# Patient Record
Sex: FEMALE | Race: WHITE | NOT HISPANIC OR LATINO | Employment: FULL TIME | URBAN - NONMETROPOLITAN AREA
[De-identification: names, ages, dates, MRNs, and addresses within clinical notes are randomized per-mention and may not be internally consistent; named-entity substitution may affect disease eponyms.]

---

## 2018-03-07 ENCOUNTER — HOSPITAL ENCOUNTER (EMERGENCY)
Facility: HOSPITAL | Age: 23
Discharge: HOME | End: 2018-03-07
Admitting: EMERGENCY MEDICINE

## 2018-03-07 VITALS
WEIGHT: 84 LBS | TEMPERATURE: 99.5 F | DIASTOLIC BLOOD PRESSURE: 71 MMHG | SYSTOLIC BLOOD PRESSURE: 122 MMHG | HEART RATE: 94 BPM | RESPIRATION RATE: 16 BRPM | HEIGHT: 60 IN | OXYGEN SATURATION: 100 % | BODY MASS INDEX: 16.49 KG/M2

## 2018-03-07 DIAGNOSIS — M62.838 MUSCLE SPASM OF LEFT SHOULDER AREA: Primary | ICD-10-CM

## 2018-03-07 PROCEDURE — 96372 THER/PROPH/DIAG INJ SC/IM: CPT

## 2018-03-07 PROCEDURE — 99283 EMERGENCY DEPT VISIT LOW MDM: CPT

## 2018-03-07 PROCEDURE — 25010000002 METHYLPREDNISOLONE PER 125 MG: Performed by: PHYSICIAN ASSISTANT

## 2018-03-07 RX ORDER — METHYLPREDNISOLONE SODIUM SUCCINATE 125 MG/2ML
125 INJECTION, POWDER, LYOPHILIZED, FOR SOLUTION INTRAMUSCULAR; INTRAVENOUS ONCE
Status: COMPLETED | OUTPATIENT
Start: 2018-03-07 | End: 2018-03-07

## 2018-03-07 RX ORDER — METHYLPREDNISOLONE 4 MG/1
TABLET ORAL
Qty: 21 EACH | Refills: 0 | Status: SHIPPED | OUTPATIENT
Start: 2018-03-07 | End: 2022-06-28

## 2018-03-07 RX ADMIN — METHYLPREDNISOLONE SODIUM SUCCINATE 125 MG: 125 INJECTION, POWDER, FOR SOLUTION INTRAMUSCULAR; INTRAVENOUS at 17:45

## 2018-03-07 NOTE — ED PROVIDER NOTES
Subjective   Patient is a 22 y.o. female presenting with upper extremity pain.   Upper Extremity Issue     Patient is a 21 yo female presenting with pain that she reports originates in her left periscapular region. The pain radiates down her left arm causing numbness in her hand. She describes the pain around her shoulder as sharp and stabbing.The pain also radiates across her chest which is exacerbated with deep breathes. She reports the pain began last night while she was driving home from work. She admits to throwing out the trash just prior to leaving which involved her lifting a heavy bag over her head to put it in the dumpster. She denies immediate pain after throwing out the trash. The pain is exacerbated when she moves her left arm and is relieved with rest. She denies prior history of back injuries. Patient denies nausea, shortness of breath, or diaphoresis. She has not taken any medications for pain.     Review of Systems   Constitutional: Positive for activity change. Negative for appetite change.   HENT: Negative.    Eyes: Negative.    Respiratory: Negative.  Negative for shortness of breath.    Cardiovascular: Positive for chest pain. Negative for palpitations and leg swelling.   Gastrointestinal: Negative.    Endocrine: Negative.    Genitourinary: Negative.    Musculoskeletal: Positive for arthralgias and myalgias.   Skin: Negative.    Allergic/Immunologic: Negative.    Neurological: Negative.  Negative for weakness and numbness.   Hematological: Negative.    Psychiatric/Behavioral: Negative.    All other systems reviewed and are negative.      No past medical history on file.    No Known Allergies    No past surgical history on file.    No family history on file.    Social History     Social History   • Marital status: Single       Prior to Admission medications    Medication Sig Start Date End Date Taking? Authorizing Provider   MethylPREDNISolone (MEDROL, LINH,) 4 MG tablet Take as directed on  "package instructions. 3/7/18   GARFIELD Arita       Medications   methylPREDNISolone sodium succinate (SOLU-Medrol) injection 125 mg (125 mg Intramuscular Given 3/7/18 1745)       /71 (BP Location: Right arm, Patient Position: Sitting)  Pulse 94  Temp 99.5 °F (37.5 °C) (Temporal Artery )   Resp 14  Ht 152.4 cm (60\")  Wt 38.1 kg (84 lb)  SpO2 100%  BMI 16.41 kg/m2      Objective   Physical Exam   Constitutional: She is oriented to person, place, and time. She appears well-developed and well-nourished. No distress.   HENT:   Head: Normocephalic and atraumatic.   Eyes: EOM are normal. Pupils are equal, round, and reactive to light.   Neck: Normal range of motion. Neck supple. No JVD present.   Cardiovascular: Normal rate, regular rhythm, normal heart sounds and intact distal pulses.  Exam reveals no gallop and no friction rub.    No murmur heard.  Pulmonary/Chest: Effort normal and breath sounds normal. No respiratory distress. She has no wheezes. She has no rales.   Abdominal: Soft. Bowel sounds are normal.   Musculoskeletal:        Arms:  Patients pain is reproduced with horizontal abduction of left arm and palpation of superomedial border of left scapula. Patient has full ROM and strength in bilateral upper extremities.    Neurological: She is alert and oriented to person, place, and time.   Patient has normal sensation and  strength of bilateral upper extremities.    Skin: Skin is warm and dry. No rash noted. She is not diaphoretic. No erythema.   Psychiatric: She has a normal mood and affect. Her behavior is normal. Thought content normal.   Nursing note and vitals reviewed.      Procedures         Lab Results (last 24 hours)     ** No results found for the last 24 hours. **          No results found.    ED Course  ED Course          MDM    Final diagnoses:   Muscle spasm of left shoulder area          GARFIELD Arita  03/07/18 1805    "

## 2018-03-07 NOTE — ED NOTES
Patient reports that around midnight last night she started experiencing pain to her left shoulder blade that radiated thorough to her left chest. The pain is described as a constant pain with intermittent episodes of sharp shooting pain. Patient also reports numbness extending down her left arm.       Moisés Scales RN  03/07/18 1977

## 2018-03-08 NOTE — ED NOTES
Discharge instructions reviewed with patient and family. Patient advised that she may leave 20 minutes after injection to make sure that there is reactions to the medication. Patient repots that she has had this medication before and would like to leave.  Family at bedside report that patient has had this medication and never had a reaction. Discharge vitals not obtained. Patient was ready for discharge.      Moisés Scales RN  03/07/18 1943

## 2018-03-12 NOTE — ED NOTES
"ED Call Back Questions    1. How are you doing since leaving the Emergency Department?    Doing better.  No ER issues.  2. Do you have any questions about your discharge instructions? No     3. Have you filled your new prescriptions yet? N/A  a. Do you have any questions about those medications? N/A    4. Were you able to make a follow-up appointment with the physician? No     5. Do you have a primary care physician? Yes   a. If No, would you like for me to set you up with one? N/A  i. If Yes, “I will have our ED  give you a call right back at this number to work with you on the best time for an appointment.”    6. We are always looking to get better at what we do. Do you have any suggestions for what we can do to be even better? No   a. If Yes, \"Thank you for sharing your concerns. I apologize. I will follow up with our manager and patient . Would you like someone to call you back?\" N/A    7. Is there anything else I can do for you? No     "

## 2018-03-22 ENCOUNTER — HOSPITAL ENCOUNTER (OUTPATIENT)
Dept: GENERAL RADIOLOGY | Age: 23
Discharge: HOME | End: 2018-03-22
Payer: COMMERCIAL

## 2018-03-22 DIAGNOSIS — R52 PAIN: ICD-10-CM

## 2018-03-22 PROCEDURE — 73130 X-RAY EXAM OF HAND: CPT

## 2020-11-02 ENCOUNTER — TRANSCRIBE ORDERS (OUTPATIENT)
Dept: OTHER | Facility: OTHER | Age: 25
End: 2020-11-02

## 2020-11-02 ENCOUNTER — TRANSCRIBE ORDERS (OUTPATIENT)
Dept: ADMINISTRATIVE | Facility: HOSPITAL | Age: 25
End: 2020-11-02

## 2020-11-02 DIAGNOSIS — Z20.822 EXPOSURE TO 2019 NOVEL CORONAVIRUS: Primary | ICD-10-CM

## 2020-11-04 ENCOUNTER — LAB (OUTPATIENT)
Dept: LAB | Facility: HOSPITAL | Age: 25
End: 2020-11-04

## 2020-11-04 DIAGNOSIS — Z20.822 EXPOSURE TO 2019 NOVEL CORONAVIRUS: ICD-10-CM

## 2020-11-04 LAB — SARS-COV-2 RNA PNL SPEC NAA+PROBE: NOT DETECTED

## 2020-11-04 PROCEDURE — 87635 SARS-COV-2 COVID-19 AMP PRB: CPT | Performed by: FAMILY MEDICINE

## 2020-11-04 PROCEDURE — C9803 HOPD COVID-19 SPEC COLLECT: HCPCS

## 2021-12-10 ENCOUNTER — LAB (OUTPATIENT)
Dept: LAB | Facility: HOSPITAL | Age: 26
End: 2021-12-10

## 2021-12-10 ENCOUNTER — TRANSCRIBE ORDERS (OUTPATIENT)
Dept: LAB | Facility: HOSPITAL | Age: 26
End: 2021-12-10

## 2021-12-10 DIAGNOSIS — Z11.59 SCREENING FOR VIRAL DISEASE: Primary | ICD-10-CM

## 2021-12-10 LAB — SARS-COV-2 ORF1AB RESP QL NAA+PROBE: NOT DETECTED

## 2021-12-10 PROCEDURE — U0004 COV-19 TEST NON-CDC HGH THRU: HCPCS | Performed by: PHYSICIAN ASSISTANT

## 2021-12-10 PROCEDURE — C9803 HOPD COVID-19 SPEC COLLECT: HCPCS | Performed by: PHYSICIAN ASSISTANT

## 2022-01-29 ENCOUNTER — APPOINTMENT (OUTPATIENT)
Dept: GENERAL RADIOLOGY | Age: 27
End: 2022-01-29
Payer: COMMERCIAL

## 2022-01-29 ENCOUNTER — HOSPITAL ENCOUNTER (EMERGENCY)
Age: 27
Discharge: HOME | End: 2022-01-29
Attending: EMERGENCY MEDICINE
Payer: COMMERCIAL

## 2022-01-29 VITALS
WEIGHT: 98 LBS | HEART RATE: 87 BPM | RESPIRATION RATE: 16 BRPM | HEIGHT: 60 IN | DIASTOLIC BLOOD PRESSURE: 72 MMHG | OXYGEN SATURATION: 98 % | BODY MASS INDEX: 19.24 KG/M2 | SYSTOLIC BLOOD PRESSURE: 120 MMHG | TEMPERATURE: 98 F

## 2022-01-29 DIAGNOSIS — S93.491A SPRAIN OF ANTERIOR TALOFIBULAR LIGAMENT OF RIGHT ANKLE, INITIAL ENCOUNTER: Primary | ICD-10-CM

## 2022-01-29 PROCEDURE — 99283 EMERGENCY DEPT VISIT LOW MDM: CPT

## 2022-01-29 PROCEDURE — 73610 X-RAY EXAM OF ANKLE: CPT

## 2022-01-29 ASSESSMENT — PAIN SCALES - GENERAL: PAINLEVEL_OUTOF10: 4

## 2022-01-29 NOTE — Clinical Note
Rhonda Vasquez was seen and treated in our emergency department on 1/29/2022. She may return to work on 02/01/2022. If you have any questions or concerns, please don't hesitate to call.       Judi Kohli MD

## 2022-01-30 NOTE — ED PROVIDER NOTES
[facility] EMERGENCY DEPT  eMERGENCY dEPARTMENT eNCOUnter      Pt Name: Yenifer Hightower  MRN: [id number]  [place] 1995  Date of evaluation: 1/29/2022  Provider: Meliza Hamlin MD    [address]       Chief Complaint   Patient presents with    Ankle Pain     Rt, no injury         HISTORY OF PRESENT ILLNESS   (Location/Symptom, Timing/Onset,Context/Setting, Quality, Duration, Modifying Factors, Severity)  Note limiting factors. Yenifer Hightower is a 23 y.o. female who presents to the emergency department for evaluation of pain in her right ankle. She describes the pain is sharp in nature, located over the lateral aspect of the ankle. States that she cannot really recall any specific injury. She denies pain in her right knee. She has been able to bear weight on the ankle however it is somewhat painful. HPI    NursingNotes were reviewed. REVIEW OF SYSTEMS    (2-9 systems for level 4, 10 or more for level 5)     Review of Systems   Musculoskeletal: Positive for gait problem and joint swelling. PAST MEDICALHISTORY   History reviewed. No pertinent past medical history. SURGICAL HISTORY     History reviewed. No pertinent surgical history. CURRENT MEDICATIONS     There are no discharge medications for this patient. ALLERGIES     Patient has no known allergies.     FAMILY HISTORY       Family History   Problem Relation Age of Onset    Seizures Mother     Diabetes Father           SOCIAL HISTORY       Social History     Socioeconomic History    Marital status: Single     Spouse name: None    Number of children: None    Years of education: None    Highest education level: None   Occupational History    None   Tobacco Use    Smoking status: Never Smoker    Smokeless tobacco: Never Used   Substance and Sexual Activity    Alcohol use: No    Drug use: No    Sexual activity: None   Other Topics Concern    None   Social History Narrative    None     Social Determinants of Health     Financial Resource Strain:     Difficulty of Paying Living Expenses: Not on file   Food Insecurity:     Worried About Running Out of Food in the Last Year: Not on file    Emily of Food in the Last Year: Not on file   Transportation Needs:     Lack of Transportation (Medical): Not on file    Lack of Transportation (Non-Medical): Not on file   Physical Activity:     Days of Exercise per Week: Not on file    Minutes of Exercise per Session: Not on file   Stress:     Feeling of Stress : Not on file   Social Connections:     Frequency of Communication with Friends and Family: Not on file    Frequency of Social Gatherings with Friends and Family: Not on file    Attends Latter day Services: Not on file    Active Member of [address] [place] [address] [employer] or Organizations: Not on file    Attends Club or Organization Meetings: Not on file    Marital Status: Not on file   Intimate Partner Violence:     Fear of Current or Ex-Partner: Not on file    Emotionally Abused: Not on file    Physically Abused: Not on file    Sexually Abused: Not on file   Housing Stability:     Unable to Pay for Housing in the Last Year: Not on file    Number of [place] in the Last Year: Not on file    Unstable Housing in the Last Year: Not on file       SCREENINGS    Tara Coma Scale  Eye Opening: Spontaneous  Best Verbal Response: Oriented  Best Motor Response: Obeys commands  Rhodes Coma Scale Score: 15        PHYSICAL EXAM    (up to 7 for level 4, 8 or more for level 5)     ED Triage Vitals   BP Temp Temp src Pulse Resp SpO2 Height Weight   01/29/22 1825 01/29/22 1824 -- 01/29/22 1825 01/29/22 1824 01/29/22 1825 01/29/22 1824 01/29/22 1824   122/76 97.9 °F (36.6 °C)  88 18 98 % 5' (1.524 m) 98 lb (44.5 kg)       Physical Exam  HENT:      Head: Atraumatic. Pulmonary:      Effort: No respiratory distress. Musculoskeletal:      Right knee: No swelling. No tenderness. Right ankle: Swelling present. Tenderness present.  Normal pulse.      Right Achilles Tendon: No tenderness. Feet:    Neurological:      Mental Status: She is alert. DIAGNOSTIC RESULTS       RADIOLOGY:  Non-plain film images such as CT, Ultrasound and MRI are read by the radiologist. Plain radiographic images are visualized and preliminarily interpreted bythe emergency physician with the below findings:      XR ANKLE RIGHT (MIN 3 VIEWS)   Final Result   No acute findings. Signed by Dr Waldemar Ramirez:  Labs Reviewed - No data to display    All other labs were within normal range or not returned as of this dictation. EMERGENCY DEPARTMENT COURSE and DIFFERENTIAL DIAGNOSIS/MDM:   Vitals:    Vitals:    01/29/22 1824 01/29/22 1825 01/29/22 2311   BP:  122/76 120/72   Pulse:  88 87   Resp: 18  16   Temp: 97.9 °F (36.6 °C)  98 °F (36.7 °C)   SpO2:  98%    Weight: 98 lb (44.5 kg)     Height: 5' (1.524 m)         MDM    Reassessment    No evidence of acute fracture identified on radiograph. PROCEDURES:  Unless otherwise noted below, none     Procedures    FINAL IMPRESSION      1. Sprain of anterior talofibular ligament of right ankle, initial encounter          DISPOSITION/PLAN   DISPOSITION Decision To Discharge 01/29/2022 10:56:54 PM      PATIENT REFERRED TO:  [facility]  [address]   [place] [id number] [phone]  Schedule an appointment as soon as possible for a visit             (Please note that portions of this note were completed with a voice recognition program.  Efforts were made to edit thedictations but occasionally words are mis-transcribed.)    Carloz Eastman MD (electronically signed)  Attending Emergency Physician         Carloz Eastman MD  02/17/22 [id number]

## 2022-06-27 ENCOUNTER — TELEPHONE (OUTPATIENT)
Dept: OBSTETRICS AND GYNECOLOGY | Facility: CLINIC | Age: 27
End: 2022-06-27

## 2022-06-27 NOTE — TELEPHONE ENCOUNTER
Received a VM from a woman stating she was pt's mom and had questions about upcoming appt.  Returned call to the pt and she denies any questions or concerns regarding appt.

## 2022-06-28 ENCOUNTER — OFFICE VISIT (OUTPATIENT)
Dept: OBSTETRICS AND GYNECOLOGY | Facility: CLINIC | Age: 27
End: 2022-06-28

## 2022-06-28 VITALS
BODY MASS INDEX: 20.03 KG/M2 | WEIGHT: 102 LBS | DIASTOLIC BLOOD PRESSURE: 68 MMHG | HEIGHT: 60 IN | SYSTOLIC BLOOD PRESSURE: 102 MMHG

## 2022-06-28 DIAGNOSIS — Z23 NEED FOR HPV VACCINE: ICD-10-CM

## 2022-06-28 DIAGNOSIS — R87.610 ATYPICAL SQUAMOUS CELLS OF UNDETERMINED SIGNIFICANCE (ASCUS) ON PAPANICOLAOU SMEAR OF CERVIX: Primary | ICD-10-CM

## 2022-06-28 DIAGNOSIS — B97.7 HPV IN FEMALE: ICD-10-CM

## 2022-06-28 LAB
B-HCG UR QL: NEGATIVE
EXPIRATION DATE: NORMAL
INTERNAL NEGATIVE CONTROL: NORMAL
INTERNAL POSITIVE CONTROL: NORMAL
Lab: NORMAL

## 2022-06-28 PROCEDURE — 90471 IMMUNIZATION ADMIN: CPT | Performed by: OBSTETRICS & GYNECOLOGY

## 2022-06-28 PROCEDURE — 57454 BX/CURETT OF CERVIX W/SCOPE: CPT | Performed by: OBSTETRICS & GYNECOLOGY

## 2022-06-28 PROCEDURE — 81025 URINE PREGNANCY TEST: CPT | Performed by: OBSTETRICS & GYNECOLOGY

## 2022-06-28 PROCEDURE — 88305 TISSUE EXAM BY PATHOLOGIST: CPT | Performed by: OBSTETRICS & GYNECOLOGY

## 2022-06-28 PROCEDURE — 90651 9VHPV VACCINE 2/3 DOSE IM: CPT | Performed by: OBSTETRICS & GYNECOLOGY

## 2022-06-28 RX ORDER — NORGESTIMATE AND ETHINYL ESTRADIOL 0.25-0.035
1 KIT ORAL DAILY
Qty: 28 TABLET | Refills: 0 | Status: SHIPPED | OUTPATIENT
Start: 2022-06-28

## 2022-06-28 RX ORDER — NORGESTIMATE AND ETHINYL ESTRADIOL 0.25-0.035
1 KIT ORAL DAILY
COMMUNITY
Start: 2022-04-14 | End: 2022-06-28 | Stop reason: SDUPTHER

## 2022-06-28 NOTE — PROGRESS NOTES
"Aurelia Hinojosa is a 26 y.o. female  here today for colposcopy.  Her most recent Pap smear was read as ASCUS + HPV.  She is on OCPs for contraception.    /68   Ht 152.4 cm (60\")   Wt 46.3 kg (102 lb)   Breastfeeding No   BMI 19.92 kg/m²      A urine pregnancy test in the office today was negative.    Colposcopy was performed in the office today.  She was placed in the lithotomy position on the exam table.  A speculum was inserted and the cervix well visualized.  The cervix was cleansed with acetic acid swabs.  Inspection with the colposcope showed the transformation zone on the ectocervix.  It was seen in its entirety.  There were acetowhite lesions noted at 6:00 o'clock.  There was some active metaplasia noted.  Colposcopy was satisfactory. The cervix was anesthetized with Hurricaine spray.  A 6:00 biopsy was performed.  The biopsy site was made hemostatic with a silver nitrate stick.  An endocervical curettage was performed.    Colposcopic impression: mild dysplasia     We will notify her when the pathology report is available to discuss further care.  We have discussed post colposcopy instructions.  Patient also counseled about Gardasil vaccination series.  Discussed with patient risk benefits.  We will start HPV vaccination series.  "

## 2022-06-29 LAB
CYTO UR: NORMAL
CYTO UR: NORMAL
LAB AP CASE REPORT: NORMAL
LAB AP CASE REPORT: NORMAL
LAB AP CLINICAL INFORMATION: NORMAL
LAB AP CLINICAL INFORMATION: NORMAL
Lab: NORMAL
Lab: NORMAL
PATH REPORT.FINAL DX SPEC: NORMAL
PATH REPORT.FINAL DX SPEC: NORMAL
PATH REPORT.GROSS SPEC: NORMAL
PATH REPORT.GROSS SPEC: NORMAL

## 2022-10-03 RX ORDER — NORGESTIMATE AND ETHINYL ESTRADIOL 0.25-0.035
KIT ORAL
Qty: 28 TABLET | Refills: 0 | OUTPATIENT
Start: 2022-10-03

## 2022-10-03 NOTE — TELEPHONE ENCOUNTER
Seen patient as a new patient for colpo 6/2022, has not been to the office since. No upcoming apts.

## 2024-03-12 ENCOUNTER — TELEPHONE (OUTPATIENT)
Dept: OBGYN CLINIC | Age: 29
End: 2024-03-12

## 2024-03-12 NOTE — TELEPHONE ENCOUNTER
Patient returning a called back to [facility].  Please called patient.    Thank you

## 2024-03-15 ENCOUNTER — OFFICE VISIT (OUTPATIENT)
Dept: OBGYN CLINIC | Age: 29
End: 2024-03-15

## 2024-03-15 VITALS
WEIGHT: 96 LBS | BODY MASS INDEX: 18.12 KG/M2 | SYSTOLIC BLOOD PRESSURE: 119 MMHG | DIASTOLIC BLOOD PRESSURE: 76 MMHG | HEART RATE: 112 BPM | HEIGHT: 61 IN

## 2024-03-15 DIAGNOSIS — N91.2 AMENORRHEA: Primary | ICD-10-CM

## 2024-03-15 DIAGNOSIS — Z20.828 EXPOSURE TO HERPES: ICD-10-CM

## 2024-03-15 DIAGNOSIS — Z36.89 CONFIRM FETAL CARDIAC ACTIVITY USING ULTRASOUND: ICD-10-CM

## 2024-03-15 DIAGNOSIS — N90.89 VULVAR LESION: ICD-10-CM

## 2024-03-15 LAB
CONTROL: ABNORMAL
PREGNANCY TEST URINE, POC: ABNORMAL

## 2024-03-15 ASSESSMENT — ENCOUNTER SYMPTOMS
ALLERGIC/IMMUNOLOGIC NEGATIVE: 1
RESPIRATORY NEGATIVE: 1
EYES NEGATIVE: 1
GASTROINTESTINAL NEGATIVE: 1

## 2024-03-15 NOTE — PROGRESS NOTES
Rhonda Vasquez is a 28 y.o. female who presents today for her medical conditions/ complaints as noted below. Rhonda Vasquez is c/o of New Patient and Confirmation        HPI  Patient presents for pregnancy confirmation, LMP around 2024.  This is pt's first baby, patient wants [facility] for prenatal care.   States periods were irregular    Patient just found out this morning that her partner has herpes, and would like to be swabbed. She had an outbreak this week as did he. He found out today after getting tested this week that he was + for herpes.   Never really had pain, just lots of sores/areas. Has pictures.     Pt UPT positive.  Patient's last menstrual period was 2024 (approximate).      History reviewed. No pertinent past medical history.  History reviewed. No pertinent surgical history.  Family History   Problem Relation Age of Onset    Seizures Mother     Diabetes Father      Social History     Tobacco Use    Smoking status: Never    Smokeless tobacco: Never   Substance Use Topics    Alcohol use: No       No current outpatient medications on file.     No current facility-administered medications for this visit.     No Known Allergies  Vitals:    03/15/24 1335   BP: 119/76   Pulse: (!) 112     Body mass index is 18.14 kg/m².    Review of Systems   Constitutional: Negative.    HENT: Negative.     Eyes: Negative.    Respiratory: Negative.     Cardiovascular: Negative.    Gastrointestinal: Negative.    Endocrine: Negative.    Genitourinary:  Positive for genital sores. Negative for difficulty urinating, dyspareunia, dysuria, enuresis, frequency, hematuria, menstrual problem, pelvic pain, urgency and vaginal discharge.   Musculoskeletal: Negative.    Skin: Negative.    Allergic/Immunologic: Negative.    Neurological: Negative.    Hematological: Negative.    Psychiatric/Behavioral: Negative.           Physical Exam  Vitals and nursing note reviewed.   Constitutional:       General: She

## 2024-03-18 RX ORDER — VALACYCLOVIR HYDROCHLORIDE 1 G/1
1000 TABLET, FILM COATED ORAL DAILY
Qty: 30 TABLET | Refills: 5 | Status: SHIPPED | OUTPATIENT
Start: 2024-03-18

## 2024-03-21 ENCOUNTER — HOSPITAL ENCOUNTER (OUTPATIENT)
Dept: ULTRASOUND IMAGING | Age: 29
Discharge: HOME | End: 2024-03-21
Payer: COMMERCIAL

## 2024-03-21 DIAGNOSIS — Z36.89 CONFIRM FETAL CARDIAC ACTIVITY USING ULTRASOUND: ICD-10-CM

## 2024-03-21 PROCEDURE — 76801 OB US < 14 WKS SINGLE FETUS: CPT

## 2024-04-10 NOTE — PATIENT INSTRUCTIONS
Patient Education        Weeks 14 to 18 of Your Pregnancy: Care Instructions  Around this time, you may start to look pregnant. Your baby is now able to pass urine. And the first stool (meconium) is starting to collect in your baby's intestines. Hair is starting to grow on your baby's head.    You may notice some skin changes, such as itchy spots on your palms or acne on your face.    At your next doctor visit, you may have an ultrasound. So you might think about whether you want to know the sex of your baby. Also ask your doctor about flu and COVID-19 shots.     How to reduce stress   Ask for help when you need it.  Try to avoid things that cause you stress.  Seek out things that relieve stress, such as breathing exercises or yoga.     How to get exercise   If you don't usually exercise, start slowly. Short walks may be a good choice.  Try to be active 30 minutes a day, at least 5 days a week.  Avoid activities where you're more likely to fall.  Use light weights to reduce stress on your joints.     How to stay at a healthy weight for you   Talk to your doctor or midwife about how much weight you should gain.  It's generally best to gain:  About 28 to 40 pounds if you're underweight.  About 25 to 35 pounds if you're at a healthy weight.  About 15 to 25 pounds if you're overweight.  About 11 to 20 pounds if you're very overweight (obese).  Follow-up care is a key part of your treatment and safety. Be sure to make and go to all appointments, and call your doctor if you are having problems. It's also a good idea to know your test results and keep a list of the medicines you take.  Where can you learn more?  Go to https://www.[employer].net/patientEd and enter I453 to learn more about \"Weeks 14 to 18 of Your Pregnancy: Care Instructions.\"  Current as of: July 10, 2023               Content Version: 14.0  © 2355-5038 [employer]wise, Incorporated.   Care instructions adapted under license by [email or web address]. If you have

## 2024-04-11 ENCOUNTER — INITIAL PRENATAL (OUTPATIENT)
Dept: OBGYN CLINIC | Age: 29
End: 2024-04-11

## 2024-04-11 VITALS
WEIGHT: 97 LBS | BODY MASS INDEX: 18.33 KG/M2 | SYSTOLIC BLOOD PRESSURE: 111 MMHG | DIASTOLIC BLOOD PRESSURE: 70 MMHG | HEART RATE: 72 BPM

## 2024-04-11 DIAGNOSIS — Z34.02 ENCOUNTER FOR PRENATAL CARE OF FIRST PREGNANCY, SECOND TRIMESTER: ICD-10-CM

## 2024-04-11 DIAGNOSIS — Z3A.15 15 WEEKS GESTATION OF PREGNANCY: ICD-10-CM

## 2024-04-11 DIAGNOSIS — Z3A.15 15 WEEKS GESTATION OF PREGNANCY: Primary | ICD-10-CM

## 2024-04-11 LAB
ABO/RH: NORMAL
ANTIBODY SCREEN: NORMAL
C TRACH DNA UR QL NAA+PROBE: NOT DETECTED
HCV AB SERPL QL IA: NORMAL
N GONORRHOEA DNA UR QL NAA+PROBE: NOT DETECTED
T VAGINALIS DNA UR QL NAA+PROBE: NOT DETECTED

## 2024-04-11 PROCEDURE — 0502F SUBSEQUENT PRENATAL CARE: CPT | Performed by: ADVANCED PRACTICE MIDWIFE

## 2024-04-11 RX ORDER — ONDANSETRON 4 MG/1
4 TABLET, FILM COATED ORAL DAILY PRN
Qty: 30 TABLET | Refills: 0 | Status: SHIPPED | OUTPATIENT
Start: 2024-04-11

## 2024-04-11 NOTE — PROGRESS NOTES
CNM Prenatal Office Note  Subjective:  Rhonda Vasquez is here for a return obstetrical visit. Today she is 15w1d weeks EGA.   She is taking her prenatal vitamins and is aware of nutrition needs. She reports the following:    Problems/complaints today:  None  Objective:  Mother's Prenatal Vitals  BP: 111/70  Weight - Scale: 44 kg (97 lb)  Pulse: 72  Patient Position: Sitting  Prenatal Fetal Information  Fetal HR: 157  Movement: Absent  Pt is A&Ox3, in no acute distress. Normocephalic, atraumatic. PERRL. Resp even and non-labored. Skin pink, warm & dry. Gravid abdomen. TORRES's well. Gait steady.   Assessment:    IUP at 15w1d wks      Diagnosis Orders   1. 15 weeks gestation of pregnancy  External Referral To Maternal Fetal Medicine    Chlamydia/N. Gonorrhoeae/T.Vaginalis, Urine    Varicella Zoster Antibody, IgG    Hepatitis C Antibody    HIV Obstetric Panel    Culture, Urine      2. Encounter for prenatal care of first pregnancy, second trimester  External Referral To Maternal Fetal Medicine    Chlamydia/N. Gonorrhoeae/T.Vaginalis, Urine    Varicella Zoster Antibody, IgG    Hepatitis C Antibody    HIV Obstetric Panel    Culture, Urine    ondansetron (ZOFRAN) 4 MG tablet        Plan:  Problems/complaints Management Plan:  None  Routine OB Management Plan:  Pt counseled on balanced nutrition, adequate fluid intake, taking PNV daily, and exercise along with  PNL today  Continue with routine prenatal care.  RTC in 4 wks for prenatal visit.      MEDICATIONS:  Orders Placed This Encounter   Medications    ondansetron (ZOFRAN) 4 MG tablet     Sig: Take 1 tablet by mouth daily as needed for Nausea or Vomiting     Dispense:  30 tablet     Refill:  0     ORDERS:  Orders Placed This Encounter   Procedures    Chlamydia/N. Gonorrhoeae/T.Vaginalis, Urine    Culture, Urine    Varicella Zoster Antibody, IgG    Hepatitis C Antibody    HIV Obstetric Panel    External Referral To Maternal Fetal Medicine       More than 50% of this 20

## 2024-04-11 NOTE — PROGRESS NOTES
Pt denies vaginal leaking, bleeding or contractions. Wants to know how much she can lift and when she needs to start taking limitation. Wants to talk about doing blood work today and wants unity. Wants to also discuss taking something for nausea.

## 2024-04-13 LAB — BACTERIA UR CULT: NORMAL

## 2024-04-15 LAB
BASOPHILS # BLD: 0 K/UL (ref 0–0.2)
BASOPHILS NFR BLD: 0.4 % (ref 0–1)
EOSINOPHIL # BLD: 0.1 K/UL (ref 0–0.6)
EOSINOPHIL NFR BLD: 0.8 % (ref 0–5)
ERYTHROCYTE [DISTWIDTH] IN BLOOD BY AUTOMATED COUNT: 13 % (ref 11.5–14.5)
HBV SURFACE AG SERPL QL IA: ABNORMAL
HCT VFR BLD AUTO: 33.6 % (ref 37–47)
HGB BLD-MCNC: 11.2 G/DL (ref 12–16)
HIV-1 P24 AG: ABNORMAL
HIV1+2 AB SERPLBLD QL IA.RAPID: ABNORMAL
IMM GRANULOCYTES # BLD: 0 K/UL
LYMPHOCYTES # BLD: 1.3 K/UL (ref 1.1–4.5)
LYMPHOCYTES NFR BLD: 14 % (ref 20–40)
MCH RBC QN AUTO: 32.3 PG (ref 27–31)
MCHC RBC AUTO-ENTMCNC: 33.3 G/DL (ref 33–37)
MCV RBC AUTO: 96.8 FL (ref 81–99)
MONOCYTES # BLD: 0.7 K/UL (ref 0–0.9)
MONOCYTES NFR BLD: 7.3 % (ref 0–10)
NEUTROPHILS # BLD: 7.4 K/UL (ref 1.5–7.5)
NEUTS SEG NFR BLD: 77.2 % (ref 50–65)
PLATELET # BLD AUTO: 296 K/UL (ref 130–400)
PMV BLD AUTO: 10 FL (ref 9.4–12.3)
RBC # BLD AUTO: 3.47 M/UL (ref 4.2–5.4)
RPR SER QL: ABNORMAL
RUBV IGG SER-ACNC: REACTIVE [IU]/ML
VZV IGG SER QL IA: 1.28
WBC # BLD AUTO: 9.6 K/UL (ref 4.8–10.8)

## 2024-04-17 ENCOUNTER — PATIENT MESSAGE (OUTPATIENT)
Dept: OBGYN CLINIC | Age: 29
End: 2024-04-17

## 2024-04-18 NOTE — TELEPHONE ENCOUNTER
From: Rhonda Vasquez  To: UTE FuentesM  Sent: 4/17/2024 10:22 AM CDT  Subject: Dental work    Hello I have a tooth that I need to get pulled out it broke in half and is causing me alot of pain. The dental office needs a letter from you stating it's ok for me to have a xray done and that it's ok for them to pull the tooth. My dental office is [facility] Fax number is [phone] my appointment is Tuesday April 23rd.

## 2024-04-29 ENCOUNTER — TELEPHONE (OUTPATIENT)
Dept: OBGYN CLINIC | Age: 29
End: 2024-04-29

## 2024-04-29 NOTE — TELEPHONE ENCOUNTER
Rhonda's mother Yuki called to reschedule an appointment for  pre  visit to possibly 24 . [facility] was unable to accommodate in the time frame needed. Please be advised that the best time to call Yuki back is Anytime.     Thank you.

## 2024-05-06 ENCOUNTER — ROUTINE PRENATAL (OUTPATIENT)
Dept: OBGYN CLINIC | Age: 29
End: 2024-05-06

## 2024-05-06 VITALS
SYSTOLIC BLOOD PRESSURE: 111 MMHG | BODY MASS INDEX: 19.46 KG/M2 | DIASTOLIC BLOOD PRESSURE: 75 MMHG | WEIGHT: 103 LBS | HEART RATE: 77 BPM

## 2024-05-06 DIAGNOSIS — B00.9 HSV-2 INFECTION: ICD-10-CM

## 2024-05-06 DIAGNOSIS — Z3A.18 18 WEEKS GESTATION OF PREGNANCY: Primary | ICD-10-CM

## 2024-05-06 DIAGNOSIS — Z34.02 ENCOUNTER FOR PRENATAL CARE OF FIRST PREGNANCY, SECOND TRIMESTER: ICD-10-CM

## 2024-05-06 PROCEDURE — 0502F SUBSEQUENT PRENATAL CARE: CPT | Performed by: ADVANCED PRACTICE MIDWIFE

## 2024-05-06 RX ORDER — VALACYCLOVIR HYDROCHLORIDE 500 MG/1
500 TABLET, FILM COATED ORAL DAILY
Qty: 30 TABLET | Refills: 4 | Status: SHIPPED | OUTPATIENT
Start: 2024-05-06 | End: 2024-06-05

## 2024-05-06 NOTE — PROGRESS NOTES
[facility] Prenatal Office Note  Subjective:  Rhonda Vasquez is here for a return obstetrical visit. Today she is 18w5d weeks EGA.   She is taking her prenatal vitamins and is aware of nutrition needs. She reports the following:    Problems/complaints today:  None   Objective:  Mother's Prenatal Vitals  BP: 111/75  Weight - Scale: 46.7 kg (103 lb)  Pulse: 77  Patient Position: Sitting  Prenatal Fetal Information  Fetal HR: 156  Movement: Absent  Pt is A&Ox3, in no acute distress. Normocephalic, atraumatic. PERRL. Resp even and non-labored. Skin pink, warm & dry. Gravid abdomen. TORRES's well. Gait steady.   Assessment:    IUP at 18w5d wks      Diagnosis Orders   1. 18 weeks gestation of pregnancy        2. Encounter for prenatal care of first pregnancy, second trimester        3. HSV-2 infection  valACYclovir (VALTREX) 500 MG tablet        Plan:  Problems/complaints Management Plan:  None  Routine OB Management Plan:  Pt counseled on balanced nutrition, adequate fluid intake, taking PNV daily, and exercise along with  anatomy scheduled  Continue with routine prenatal care.  RTC in 4 wks for prenatal visit.      MEDICATIONS:  Orders Placed This Encounter   Medications    valACYclovir (VALTREX) 500 MG tablet     Sig: Take 1 tablet by mouth daily     Dispense:  30 tablet     Refill:  4     ORDERS:  No orders of the defined types were placed in this encounter.      More than 50% of this 20 min visit was education and counseling.      JOSÉ Layne LPN, am scribing for and in the presence of Breanne Robledo CNM  5/6/24  1:49 PM CDT .  .I have seen and examined the patient independently.  I reviewed all laboratory and imaging studies that are relevant.  I have reviewed and made any appropriate changes to the HPI.    Electronically signed by UTE Fuentes CNM on 5/6/24  at 2:11 PM CDT

## 2024-05-20 DIAGNOSIS — Z3A.20 20 WEEKS GESTATION OF PREGNANCY: ICD-10-CM

## 2024-05-20 DIAGNOSIS — Z3A.20 20 WEEKS GESTATION OF PREGNANCY: Primary | ICD-10-CM

## 2024-06-19 ENCOUNTER — ROUTINE PRENATAL (OUTPATIENT)
Dept: OBGYN CLINIC | Age: 29
End: 2024-06-19

## 2024-06-19 VITALS
DIASTOLIC BLOOD PRESSURE: 71 MMHG | SYSTOLIC BLOOD PRESSURE: 115 MMHG | HEART RATE: 82 BPM | WEIGHT: 110 LBS | BODY MASS INDEX: 20.78 KG/M2

## 2024-06-19 DIAGNOSIS — Z3A.25 25 WEEKS GESTATION OF PREGNANCY: Primary | ICD-10-CM

## 2024-06-19 DIAGNOSIS — Z34.02 ENCOUNTER FOR PRENATAL CARE OF FIRST PREGNANCY, SECOND TRIMESTER: ICD-10-CM

## 2024-06-19 DIAGNOSIS — Z3A.25 25 WEEKS GESTATION OF PREGNANCY: ICD-10-CM

## 2024-06-19 PROCEDURE — 0502F SUBSEQUENT PRENATAL CARE: CPT | Performed by: ADVANCED PRACTICE MIDWIFE

## 2024-06-19 NOTE — PROGRESS NOTES
Pt presents today for routine prenatal visit. Pt denies vaginal bleeding, cramping, or leaking of fluid +fetal movement.     Pt would like to have some spots on her chest looked at.

## 2024-06-19 NOTE — PROGRESS NOTES
CNM Prenatal Office Note  Subjective:  Rhonda Vasquez is here for a return obstetrical visit. Today she is 25w0d weeks EGA.   She is taking her prenatal vitamins and is aware of nutrition needs. She reports the following:    Problems/complaints today:  None  Objective:  Mother's Prenatal Vitals  BP: 115/71  Weight - Scale: 49.9 kg (110 lb)  Pulse: 82  Patient Position: Sitting  Prenatal Fetal Information  Fundal Height (cm): 25 cm  Fetal HR: 148-tpg  Movement: Present  Pt is A&Ox3, in no acute distress. Normocephalic, atraumatic. PERRL. Resp even and non-labored. Skin pink, warm & dry. Gravid abdomen. TORRES's well. Gait steady.   Assessment:    IUP at 25w0d wks      Diagnosis Orders   1. 25 weeks gestation of pregnancy  Glucose 1 Hour Postprandial    CBC with Auto Differential    RPR Reflex to Titer and TPPA      2. Encounter for prenatal care of first pregnancy, second trimester  Glucose 1 Hour Postprandial    CBC with Auto Differential    RPR Reflex to Titer and TPPA        Plan:  Problems/complaints Management Plan:  None  Routine OB Management Plan:  Pt counseled on balanced nutrition, adequate fluid intake, taking PNV daily, and exercise along with  anatomy reviewed  Continue with routine prenatal care.  RTC in 3 wks for prenatal visit.      MEDICATIONS:  No orders of the defined types were placed in this encounter.    ORDERS:  Orders Placed This Encounter   Procedures    Glucose 1 Hour Postprandial    CBC with Auto Differential    RPR Reflex to Titer and TPPA       More than 50% of this 20 min visit was education and counseling.  .

## 2024-06-19 NOTE — PATIENT INSTRUCTIONS
2023               Content Version: 14.0  © 2006-2024 [employer].   Care instructions adapted under license by [employer]. If you have questions about a medical condition or this instruction, always ask your healthcare professional. [employer] disclaims any warranty or liability for your use of this information.       Learning About Screening for Gestational Diabetes  What is gestational diabetes screening?     Screening for gestational diabetes is a way to look for high blood sugar during pregnancy. You drink some very sweet liquid. Then you have a blood test to see how your body uses sugar (glucose).  How is gestational diabetes screening done?  Screening for gestational diabetes may be done in a couple of ways.  Two-part screening.  Part one (glucose challenge test): A blood sample is taken after you drink a liquid that contains sugar (glucose). You don't need to stop eating or drinking before this test. If the test shows that you don't have a lot of sugar in your blood, you don't have gestational diabetes.  Part two (oral glucose tolerance test, or OGTT): If the first test shows a lot of sugar in your blood, then you may have an OGTT. You can't eat or drink for at least 8 hours before this test. A blood sample is taken, then you drink a sweet liquid. You have more blood tests after 1 to 3 hours. If the OGTT shows that you have a lot of sugar in your blood, you may have gestational diabetes.  One-part screening.  Sometimes doctors use the OGTT on its own. If the test shows that you don't have a lot of sugar in your blood, you don't have gestational diabetes. If you do have a lot of sugar in your blood, you may have the condition.  What are the risks of screening?  Your blood glucose level may drop very low toward the end of the test. If this happens, you may feel weak, hungry, and restless. Tell your doctor if you have these symptoms. The test usually will be stopped.  You may vomit

## 2024-07-10 ENCOUNTER — ROUTINE PRENATAL (OUTPATIENT)
Dept: OBGYN CLINIC | Age: 29
End: 2024-07-10

## 2024-07-10 VITALS
SYSTOLIC BLOOD PRESSURE: 115 MMHG | HEART RATE: 80 BPM | WEIGHT: 116 LBS | BODY MASS INDEX: 21.92 KG/M2 | DIASTOLIC BLOOD PRESSURE: 74 MMHG

## 2024-07-10 DIAGNOSIS — Z34.03 ENCOUNTER FOR PRENATAL CARE OF FIRST PREGNANCY, THIRD TRIMESTER: ICD-10-CM

## 2024-07-10 DIAGNOSIS — Z3A.28 28 WEEKS GESTATION OF PREGNANCY: Primary | ICD-10-CM

## 2024-07-10 DIAGNOSIS — Z13.32 ENCOUNTER FOR SCREENING FOR MATERNAL DEPRESSION: ICD-10-CM

## 2024-07-10 DIAGNOSIS — B00.9 HSV-2 INFECTION: ICD-10-CM

## 2024-07-10 DIAGNOSIS — Z3A.25 25 WEEKS GESTATION OF PREGNANCY: ICD-10-CM

## 2024-07-10 DIAGNOSIS — Z34.02 ENCOUNTER FOR PRENATAL CARE OF FIRST PREGNANCY, SECOND TRIMESTER: ICD-10-CM

## 2024-07-10 LAB
BASOPHILS # BLD: 0 K/UL (ref 0–0.2)
BASOPHILS NFR BLD: 0.3 % (ref 0–1)
EOSINOPHIL # BLD: 0.1 K/UL (ref 0–0.6)
EOSINOPHIL NFR BLD: 1.2 % (ref 0–5)
ERYTHROCYTE [DISTWIDTH] IN BLOOD BY AUTOMATED COUNT: 11.7 % (ref 11.5–14.5)
GLUCOSE 1H P MEAL SERPL-MCNC: 70 MG/DL (ref 75–140)
HCT VFR BLD AUTO: 32.5 % (ref 37–47)
HGB BLD-MCNC: 10.3 G/DL (ref 12–16)
IMM GRANULOCYTES # BLD: 0 K/UL
LYMPHOCYTES # BLD: 1.4 K/UL (ref 1.1–4.5)
LYMPHOCYTES NFR BLD: 13.8 % (ref 20–40)
MCH RBC QN AUTO: 30.6 PG (ref 27–31)
MCHC RBC AUTO-ENTMCNC: 31.7 G/DL (ref 33–37)
MCV RBC AUTO: 96.4 FL (ref 81–99)
MONOCYTES # BLD: 0.7 K/UL (ref 0–0.9)
MONOCYTES NFR BLD: 6.3 % (ref 0–10)
NEUTROPHILS # BLD: 8.1 K/UL (ref 1.5–7.5)
NEUTS SEG NFR BLD: 78.1 % (ref 50–65)
PLATELET # BLD AUTO: 270 K/UL (ref 130–400)
PMV BLD AUTO: 11 FL (ref 9.4–12.3)
RBC # BLD AUTO: 3.37 M/UL (ref 4.2–5.4)
WBC # BLD AUTO: 10.4 K/UL (ref 4.8–10.8)

## 2024-07-10 PROCEDURE — 0502F SUBSEQUENT PRENATAL CARE: CPT | Performed by: ADVANCED PRACTICE MIDWIFE

## 2024-07-10 PROCEDURE — S3005 EVAL SELF-ASSESS DEPRESSION: HCPCS | Performed by: ADVANCED PRACTICE MIDWIFE

## 2024-07-10 NOTE — PROGRESS NOTES
CNM Prenatal Office Note  Subjective:  Rhonda Vasquez is here for a return obstetrical visit. Today she is 28w0d weeks EGA.  Pt does feel fetal movement regularly. Denies headaches, RUQ pain, or visual changes as well as contractions, vaginal bleeding or leaking of fluid. 1 hour GCT today. Rhogam not indicated.   Problems/Complaints today:  None  Objective:  Mother's Prenatal Vitals  BP: 115/74  Weight - Scale: 52.6 kg (116 lb)  Pulse: 80  Patient Position: Sitting  Prenatal Fetal Information  Fundal Height (cm): 28 cm  Fetal HR: US  Movement: Present  Pt is A&Ox3, in no acute distress. Normocephalic, atraumatic. PERRL. Resp even and non-labored. Skin pink, warm & dry. Gravid abdomen. TORRES's well. Gait steady. EPDS Screenin/30  Assessment:    IUP at 28w0d wks      Diagnosis Orders   1. 28 weeks gestation of pregnancy  IA EVAL SELF-ASSESS DEPRESSION      2. Encounter for screening for maternal depression  IA EVAL SELF-ASSESS DEPRESSION      3. Encounter for prenatal care of first pregnancy, third trimester  IA EVAL SELF-ASSESS DEPRESSION      4. HSV-2 infection          Plan:  Problems/Complaints Management Plan:  None  Routine OB Management Plan:  Third trimester teaching completed including warning signs for pre-eclampsia (blurred vision, seeing spots/sparkles, sudden increased weight gain or profound edema, epigastric pain), FKC (decreased fetal movments),  labor ( contractions or watery discharge, vaginal bleeding, cramping), n/v, chills, and or fever. Instructed pt to come to office or go to [facility]DR if these symptoms presented. Pt voiced understanding.   Pt counseled on balanced nutrition, adequate fluid intake, taking PNV daily, and exercise along with GHTN precautions, Kick count, and  labor  Continue with routine prenatal care.  RTC in 2 wks for prenatal visit      MEDICATIONS:  No orders of the defined types were placed in this encounter.    ORDERS:  Orders Placed This Encounter

## 2024-07-10 NOTE — PATIENT INSTRUCTIONS
previous dose of any pertussis vaccine (DTP, DTaP, or Tdap)  Has seizures or another nervous system problem  Has ever had Guillain-Barré Syndrome (also called \"GBS\")  Has had severe pain or swelling after a previous dose of any vaccine that protects against tetanus or diphtheria  In some cases, your health care provider may decide to postpone Tdap vaccination until a future visit.  People with minor illnesses, such as a cold, may be vaccinated. People who are moderately or severely ill should usually wait until they recover before getting Tdap vaccine.  Your health care provider can give you more information.  Risks of a vaccine reaction  Pain, redness, or swelling where the shot was given, mild fever, headache, feeling tired, and nausea, vomiting, diarrhea, or stomachache sometimes happen after Tdap vaccination.  People sometimes faint after medical procedures, including vaccination. Tell your provider if you feel dizzy or have vision changes or ringing in the ears.  As with any medicine, there is a very remote chance of a vaccine causing a severe allergic reaction, other serious injury, or death.  What if there is a serious problem?  An allergic reaction could occur after the vaccinated person leaves the clinic. If you see signs of a severe allergic reaction (hives, swelling of the face and throat, difficulty breathing, a fast heartbeat, dizziness, or weakness), call 9-1-1 and get the person to the nearest hospital.  For other signs that concern you, call your health care provider.  Adverse reactions should be reported to the Vaccine Adverse Event Reporting System (VAERS). Your health care provider will usually file this report, or you can do it yourself. Visit the VAERS website at www.vaers.hhs.gov or call 1-832.542.5060. VAERS is only for reporting reactions, and VAERS staff members do not give medical advice.  The National Vaccine Injury Compensation Program  The National Vaccine Injury Compensation Program

## 2024-07-11 LAB — RPR SER QL: NORMAL

## 2024-07-23 NOTE — PATIENT INSTRUCTIONS
instruction, always ask your healthcare professional. [employer] disclaims any warranty or liability for your use of this information.

## 2024-07-24 ENCOUNTER — ROUTINE PRENATAL (OUTPATIENT)
Dept: OBGYN CLINIC | Age: 29
End: 2024-07-24

## 2024-07-24 VITALS
BODY MASS INDEX: 21.54 KG/M2 | SYSTOLIC BLOOD PRESSURE: 115 MMHG | HEART RATE: 76 BPM | WEIGHT: 114 LBS | DIASTOLIC BLOOD PRESSURE: 71 MMHG

## 2024-07-24 DIAGNOSIS — B00.9 HSV-2 INFECTION: ICD-10-CM

## 2024-07-24 DIAGNOSIS — Z34.03 ENCOUNTER FOR PRENATAL CARE OF FIRST PREGNANCY, THIRD TRIMESTER: ICD-10-CM

## 2024-07-24 DIAGNOSIS — Z3A.30 30 WEEKS GESTATION OF PREGNANCY: Primary | ICD-10-CM

## 2024-07-24 PROCEDURE — 0502F SUBSEQUENT PRENATAL CARE: CPT | Performed by: ADVANCED PRACTICE MIDWIFE

## 2024-07-24 NOTE — PROGRESS NOTES
[facility] Prenatal Office Note  Subjective:  Rhonda Vasquez is here for a return obstetrical visit. Today she is 30w0d weeks EGA.  Pt does feel fetal movement regularly. Denies headaches, RUQ pain, or visual changes as well as contractions, vaginal bleeding or leaking of fluid.     Problems/complaints today:  None  Objective:  Mother's Prenatal Vitals  BP: 115/71  Weight - Scale: 51.7 kg (114 lb)  Pulse: 76  Patient Position: Sitting  Prenatal Fetal Information  Fundal Height (cm): 29 cm  Fetal HR: 145  Movement: Present  Pt is A&Ox3, in no acute distress. Normocephalic, atraumatic. PERRL. Resp even and non-labored. Skin pink, warm & dry. Gravid abdomen. TORRES's well. Gait steady.   Assessment:    IUP at 30w0d wks      Diagnosis Orders   1. 30 weeks gestation of pregnancy        2. Encounter for prenatal care of first pregnancy, third trimester        3. HSV-2 infection          Plan:  Problems/Complaints Management Plan:  None  Routine OB Management Plan:  Pt counseled on balanced nutrition, adequate fluid intake, taking PNV daily, and exercise along with GHTN precautions, Kick count, and  labor  Continue with routine prenatal care.   surveillance not indicated  RTC in 2 wks for prenatal visit    MEDICATIONS:  No orders of the defined types were placed in this encounter.    ORDERS:  No orders of the defined types were placed in this encounter.      More than 50% of this 20 min visit was education and counseling.        I Samina Layne LPN, am scribing for and in the presence of Breanne Robledo CNM  24  3:07 PM CDT   I have seen and examined the patient independently.  I reviewed all laboratory and imaging studies that are relevant.  I have reviewed and made any appropriate changes to the HPI.    Electronically signed by UTE Fuentes CNM on 24  at 5:03 PM CDT

## 2024-07-24 NOTE — PROGRESS NOTES
Pt presents today for routine prenatal visit. Pt denies vaginal bleeding, cramping, or leaking of fluid +fetal movement.     Pt states that she has lost her appetite.

## 2024-08-05 ENCOUNTER — TELEPHONE (OUTPATIENT)
Dept: OBGYN CLINIC | Age: 29
End: 2024-08-05

## 2024-08-05 NOTE — TELEPHONE ENCOUNTER
Rhonda requests a call back to r/s PN visit as she has an appt with the  group, patient is available for a call back after 11:00 a.m. please.    Thank you.

## 2024-08-07 ENCOUNTER — ROUTINE PRENATAL (OUTPATIENT)
Dept: OBGYN CLINIC | Age: 29
End: 2024-08-07

## 2024-08-07 VITALS
HEART RATE: 84 BPM | SYSTOLIC BLOOD PRESSURE: 116 MMHG | BODY MASS INDEX: 22.67 KG/M2 | WEIGHT: 120 LBS | DIASTOLIC BLOOD PRESSURE: 74 MMHG

## 2024-08-07 DIAGNOSIS — B00.9 HSV-2 INFECTION: ICD-10-CM

## 2024-08-07 DIAGNOSIS — Z34.03 ENCOUNTER FOR PRENATAL CARE OF FIRST PREGNANCY, THIRD TRIMESTER: ICD-10-CM

## 2024-08-07 DIAGNOSIS — Z3A.32 32 WEEKS GESTATION OF PREGNANCY: Primary | ICD-10-CM

## 2024-08-07 PROCEDURE — 0502F SUBSEQUENT PRENATAL CARE: CPT | Performed by: ADVANCED PRACTICE MIDWIFE

## 2024-08-07 NOTE — PATIENT INSTRUCTIONS
slide down until your knees are slightly bent, pressing your lower back against the wall.  Hold for at least 6 seconds, then slide back up the wall.  Repeat 8 to 12 times.  Over time, work up to holding this position for as much as 1 minute.  Trunk twist    Sit on the floor with your legs crossed. If that's not comfortable, you can sit on a folded blanket so your bottom is a few inches off the floor. Or you can sit on a chair with your knees hip-width apart and your feet flat on the floor.  Reach your left hand toward your right knee. You can place your right hand at your side for support.  Slowly twist your body (trunk) to your right.  Relax and return to your starting position.  Repeat 2 to 4 times.  Switch your hands and twist to your left.  Repeat 2 to 4 times.  Pelvic rocking on hands and knees    Start on your hands and knees. Place your wrists directly below your shoulders and your knees below your hips.  Breathe in slowly. Tuck your head downward and round your back up, making a curve with your back in the shape of the letter C. Hold this position for about 6 seconds.  Breathe out slowly and bring your head back up. Relax, keeping your back straight. (Don't allow it to curve toward the floor.) Hold for about 6 seconds.  Repeat 8 to 12 times, gently rocking your pelvis.  Pelvic tilt    Lie on your back with your knees bent and your feet flat on the floor.  Tighten your belly muscles by pulling your belly button in toward your spine. Press your lower back to the floor. You should feel your hips and pelvis rock back.  Hold for 6 seconds while breathing smoothly, and then relax.  Repeat 8 to 12 times.  Do this exercise only during the first 4 months of pregnancy. After this point, lying on your back is not recommended, because it can cause blood flow problems for you and your baby.  Backward stretch    Start on your hands and knees with your knees 8 to 10 inches apart, hands directly below your shoulders, and

## 2024-08-07 NOTE — PROGRESS NOTES
[facility] Prenatal Office Note  Subjective:  Rhonda Vasquez is here for a return obstetrical visit. Today she is 32w0d weeks EGA.  Pt does feel fetal movement regularly. Denies headaches, RUQ pain, or visual changes as well as contractions, vaginal bleeding or leaking of fluid.     Problems/complaints today:  None  Objective:  Mother's Prenatal Vitals  BP: 116/74  Weight - Scale: 54.4 kg (120 lb)  Pulse: 84  Patient Position: Sitting  Prenatal Fetal Information  Fetal HR: 143-tpg  Movement: Present  Pt is A&Ox3, in no acute distress. Normocephalic, atraumatic. PERRL. Resp even and non-labored. Skin pink, warm & dry. Gravid abdomen. TORRES's well. Gait steady.   Assessment:    IUP at 32w0d wks      Diagnosis Orders   1. 32 weeks gestation of pregnancy        2. Encounter for prenatal care of first pregnancy, third trimester        3. HSV-2 infection          Plan:  Problems/Complaints Management Plan:  None  Routine OB Management Plan:  Pt counseled on balanced nutrition, adequate fluid intake, taking PNV daily, and exercise along with GHTN precautions, Kick count, and  labor  Continue with routine prenatal care.   surveillance not indicated   RTC in 2 wks for prenatal visit    MEDICATIONS:  No orders of the defined types were placed in this encounter.    ORDERS:  No orders of the defined types were placed in this encounter.      More than 50% of this 20 min visit was education and counseling.      JOSÉ Layne LPN, am scribing for and in the presence of Breanne Robledo CNM  24  2:36 PM CDT   I have seen and examined the patient independently.  I reviewed all laboratory and imaging studies that are relevant.  I have reviewed and made any appropriate changes to the HPI.    Electronically signed by UTE Fuentes CNM on 24  at 6:28 PM CDT

## 2024-08-08 DIAGNOSIS — Z3A.32 32 WEEKS GESTATION OF PREGNANCY: Primary | ICD-10-CM

## 2024-08-08 DIAGNOSIS — Z3A.32 32 WEEKS GESTATION OF PREGNANCY: ICD-10-CM

## 2024-08-21 ENCOUNTER — ROUTINE PRENATAL (OUTPATIENT)
Dept: OBGYN CLINIC | Age: 29
End: 2024-08-21
Payer: MEDICAID

## 2024-08-21 VITALS
HEART RATE: 75 BPM | WEIGHT: 118 LBS | DIASTOLIC BLOOD PRESSURE: 71 MMHG | BODY MASS INDEX: 22.3 KG/M2 | SYSTOLIC BLOOD PRESSURE: 113 MMHG

## 2024-08-21 DIAGNOSIS — B00.9 HSV-2 INFECTION: ICD-10-CM

## 2024-08-21 DIAGNOSIS — Z3A.34 34 WEEKS GESTATION OF PREGNANCY: Primary | ICD-10-CM

## 2024-08-21 DIAGNOSIS — Z34.03 ENCOUNTER FOR PRENATAL CARE OF FIRST PREGNANCY, THIRD TRIMESTER: ICD-10-CM

## 2024-08-21 PROCEDURE — G8427 DOCREV CUR MEDS BY ELIG CLIN: HCPCS | Performed by: ADVANCED PRACTICE MIDWIFE

## 2024-08-21 PROCEDURE — G8420 CALC BMI NORM PARAMETERS: HCPCS | Performed by: ADVANCED PRACTICE MIDWIFE

## 2024-08-21 PROCEDURE — 99213 OFFICE O/P EST LOW 20 MIN: CPT | Performed by: ADVANCED PRACTICE MIDWIFE

## 2024-08-21 PROCEDURE — 1036F TOBACCO NON-USER: CPT | Performed by: ADVANCED PRACTICE MIDWIFE

## 2024-08-21 NOTE — PROGRESS NOTES
[facility] Prenatal Office Note  Subjective:  Rhonda Vasquez is here for a return obstetrical visit. Today she is 34w0d weeks EGA.  Pt does feel fetal movement regularly. Denies headaches, RUQ pain, or visual changes as well as contractions, vaginal bleeding or leaking of fluid.     Problems/complaints today:  None  Objective:  Mother's Prenatal Vitals  BP: 113/71  Weight - Scale: 53.5 kg (118 lb)  Pulse: 75  Patient Position: Sitting  Prenatal Fetal Information  Fundal Height (cm): 34 cm  Fetal HR: 145  Movement: Present  Pt is A&Ox3, in no acute distress. Normocephalic, atraumatic. PERRL. Resp even and non-labored. Skin pink, warm & dry. Gravid abdomen. TORRES's well. Gait steady.   Assessment:    IUP at 34w0d wks      Diagnosis Orders   1. 34 weeks gestation of pregnancy        2. Encounter for prenatal care of first pregnancy, third trimester        3. HSV-2 infection          Plan:  Problems/Complaints Management Plan:  None  Routine OB Management Plan:  Pt counseled on balanced nutrition, adequate fluid intake, taking PNV daily, and exercise along with GHTN precautions, Kick count, and  labor  Continue with routine prenatal care.   surveillance not indicated  RTC in 2 wks for prenatal visit    MEDICATIONS:  No orders of the defined types were placed in this encounter.    ORDERS:  No orders of the defined types were placed in this encounter.      More than 50% of this 20 min visit was education and counseling.        JOSÉ Layne LPN, am scribing for and in the presence of Breanne Robledo CNM  24  2:08 PM CDT   I have seen and examined the patient independently.  I reviewed all laboratory and imaging studies that are relevant.  I have reviewed and made any appropriate changes to the HPI.    Electronically signed by UTE Fuentes CNM on 24  at 12:10 AM CDT

## 2024-08-28 SDOH — ECONOMIC STABILITY: INCOME INSECURITY: HOW HARD IS IT FOR YOU TO PAY FOR THE VERY BASICS LIKE FOOD, HOUSING, MEDICAL CARE, AND HEATING?: NOT VERY HARD

## 2024-08-28 SDOH — ECONOMIC STABILITY: FOOD INSECURITY: WITHIN THE PAST 12 MONTHS, THE FOOD YOU BOUGHT JUST DIDN'T LAST AND YOU DIDN'T HAVE MONEY TO GET MORE.: NEVER TRUE

## 2024-08-28 SDOH — ECONOMIC STABILITY: FOOD INSECURITY: WITHIN THE PAST 12 MONTHS, YOU WORRIED THAT YOUR FOOD WOULD RUN OUT BEFORE YOU GOT MONEY TO BUY MORE.: NEVER TRUE

## 2024-08-28 SDOH — ECONOMIC STABILITY: TRANSPORTATION INSECURITY
IN THE PAST 12 MONTHS, HAS LACK OF TRANSPORTATION KEPT YOU FROM MEETINGS, WORK, OR FROM GETTING THINGS NEEDED FOR DAILY LIVING?: NO

## 2024-08-29 ENCOUNTER — ROUTINE PRENATAL (OUTPATIENT)
Dept: OBGYN CLINIC | Age: 29
End: 2024-08-29

## 2024-08-29 VITALS
DIASTOLIC BLOOD PRESSURE: 76 MMHG | SYSTOLIC BLOOD PRESSURE: 117 MMHG | BODY MASS INDEX: 22.3 KG/M2 | WEIGHT: 118 LBS | HEART RATE: 82 BPM

## 2024-08-29 DIAGNOSIS — Z3A.35 35 WEEKS GESTATION OF PREGNANCY: Primary | ICD-10-CM

## 2024-08-29 DIAGNOSIS — Z34.03 ENCOUNTER FOR PRENATAL CARE OF FIRST PREGNANCY, THIRD TRIMESTER: ICD-10-CM

## 2024-08-29 DIAGNOSIS — Z36.85 ANTENATAL SCREENING FOR STREPTOCOCCUS B: ICD-10-CM

## 2024-08-29 DIAGNOSIS — B00.9 HSV-2 INFECTION: ICD-10-CM

## 2024-08-29 LAB
C TRACH DNA CVX QL NAA+PROBE: NOT DETECTED
N GONORRHOEA DNA CERV MUCUS QL NAA+PROBE: NOT DETECTED
T VAGINALIS DNA GENITAL QL NAA+PROBE: NOT DETECTED

## 2024-08-29 PROCEDURE — 0502F SUBSEQUENT PRENATAL CARE: CPT | Performed by: ADVANCED PRACTICE MIDWIFE

## 2024-08-29 NOTE — PATIENT INSTRUCTIONS
Learning About When to Call Your Doctor During Pregnancy (After 20 Weeks)  Overview  It's common to have concerns about what might be a problem when you're pregnant. Most pregnancies don't have any serious problems. But it's still important to know when to call your doctor if you have certain symptoms or signs of labor.  These are general suggestions. Your doctor may give you some more information about when to call.  When to call your doctor (after 20 weeks)  Call 911  anytime you think you may need emergency care. For example, call if:  You have severe vaginal bleeding. This means you are soaking through a pad each hour for 2 or more hours.  You have sudden, severe pain in your belly.  You have chest pain, are short of breath, or cough up blood.  You passed out (lost consciousness).  You have a seizure.  You see or feel the umbilical cord.  You think you are about to deliver your baby and can't make it safely to the hospital or birthing center.  Call your doctor now or seek immediate medical care if:  You have vaginal bleeding.  You have belly pain.  You have a fever.  You are dizzy or lightheaded, or you feel like you may faint.  You have signs of a blood clot in your leg (called a deep vein thrombosis), such as:  Pain in the calf, back of the knee, thigh, or groin.  Swelling in your leg or groin.  A color change on the leg or groin. The skin may be reddish or purplish, depending on your usual skin color.  You have symptoms of preeclampsia, such as:  Sudden swelling of your face, hands, or feet.  New vision problems (such as dimness, blurring, or seeing spots).  A severe headache.  You have a sudden release of fluid from your vagina. (You think your water broke.)  You've been having regular contractions for an hour. This means that you've had at least 6 contractions within 1 hour, even after you change your position and drink fluids.  You notice that your baby has stopped moving or is moving less than  use of this information.       Back Pain During Pregnancy: Care Instructions  Overview     Back pain has many possible causes. It is often caused by problems with muscles and ligaments in your back. The extra weight during pregnancy can put stress on your back. Moving, lifting, standing, sitting, or sleeping in an awkward way also can strain your back. Back pain can also be a sign of labor. Although it may hurt a lot, back pain often improves on its own. Use good home treatment, and take care not to stress your back.  Follow-up care is a key part of your treatment and safety. Be sure to make and go to all appointments, and call your doctor if you are having problems. It's also a good idea to know your test results and keep a list of the medicines you take.  How can you care for yourself at home?  Ask your doctor about taking acetaminophen (Tylenol) for pain. Do not take aspirin, ibuprofen (Advil, Motrin), or naproxen (Aleve).  Do not take two or more pain medicines at the same time unless the doctor told you to. Many pain medicines have acetaminophen, which is Tylenol. Too much acetaminophen (Tylenol) can be harmful.  Try heat or ice, whichever feels better. Apply it for 10 to 20 minutes at a time, several times a day. Put a thin cloth between the heat or ice and your skin. A warm bath or shower may also help.  Lie on your left side with your knees and hips bent and a pillow between your legs. This reduces stress on your back.  Try to avoid standing or sitting for too long or heavy lifting. If your job requires lots of standing, sitting, or heavy lifting, ask your employer if you can take short breaks or adjust your work activity. You can ask your doctor to write a note requesting these breaks or other adjustments.  Wear supportive, low-heeled shoes. Avoid flat or high-heeled shoes.  Try a belly support band. Supporting your belly can take the strain off your back.  Ask your doctor about how much exercise you can

## 2024-08-29 NOTE — PROGRESS NOTES
CNM Prenatal Office Note  Subjective:  Rhonda Vasquez is here for a return obstetrical visit. Today she is 35w1d weeks EGA.  Pt does feel fetal movement regularly. Denies headaches, RUQ pain, or visual changes as well as contractions, vaginal bleeding or leaking of fluid.     Problems/complaints today:  No complaints today  GBS/STD screen today  Objective:  Mother's Prenatal Vitals  BP: 117/76  Weight - Scale: 53.5 kg (118 lb)  Pulse: 82  Patient Position: Sitting  Pt is A&Ox3, in no acute distress. Normocephalic, atraumatic. PERRL. Resp even and non-labored. Skin pink, warm & dry. Gravid abdomen. TORRES's well. Gait steady.   Assessment:    IUP at 35w1d wks      Diagnosis Orders   1. 35 weeks gestation of pregnancy  Strep B DNA probe, amplification    Chlamydia, Gonorrhea, Trichomoniasis      2. Encounter for prenatal care of first pregnancy, third trimester  Strep B DNA probe, amplification    Chlamydia, Gonorrhea, Trichomoniasis      3. HSV-2 infection        4.  screening for streptococcus B  Strep B DNA probe, amplification        Plan:   Problems/Complatins Management Plan:  GBS and STD screen collected  HSV exposure - valacyclovir 1 gram PO pt reports taking daily, no lesions notetd  Routine OB Management Plan:  GBS collected and labor education completed. Encourage perineal massage. Discussed pain management options during labor and birth plan. Pt counseled on counseled on balanced nutrition, adequate fluid intake, taking PNV daily, and exercise along with GHTN precautions, Kick count, and  labor  Continue with routine prenatal care.   surveillance not indicated  RTC in 1 wks for prenatal visit    MEDICATIONS:  No orders of the defined types were placed in this encounter.    ORDERS:  Orders Placed This Encounter   Procedures    Chlamydia, Gonorrhea, Trichomoniasis    Strep B DNA probe, amplification       More than 50% of this 20 min visit was education and

## 2024-08-30 LAB — GP B STREP DNA SPEC QL NAA+PROBE: NOT DETECTED

## 2024-09-04 NOTE — PATIENT INSTRUCTIONS
normal.  You have signs of heart failure, such as:  New or increased shortness of breath.  New or worse swelling in your legs, ankles, or feet.  Sudden weight gain, such as more than 2 to 3 pounds in a day or 5 pounds in a week.  Feeling so tired or weak that you cannot do your usual activities.  You have symptoms of a urinary tract infection. These may include:  Pain or burning when you urinate.  A frequent need to urinate without being able to pass much urine.  Pain in the flank, which is just below the rib cage and above the waist on either side of the back.  Blood in your urine.  Watch closely for changes in your health, and be sure to contact your doctor if:  You have vaginal discharge that smells bad.  You feel sad, anxious, or hopeless for more than a few days.  You have skin changes, such as a rash, itching, or a yellow color to your skin.  You have other concerns about your pregnancy.  If you have labor signs at 37 weeks or more  If you have signs of labor at 37 weeks or more, your doctor may tell you to call when your labor becomes more active. Symptoms of active labor include:  Contractions that are regular.  Contractions that are less than 5 minutes apart.  Contractions that are hard to talk through.  Follow-up care is a key part of your treatment and safety. Be sure to make and go to all appointments, and call your doctor if you are having problems. It's also a good idea to know your test results and keep a list of the medicines you take.  Where can you learn more?  Go to https://www.[employer].net/patientEd and enter N531 to learn more about \"Learning About When to Call Your Doctor During Pregnancy (After 20 Weeks).\"  Current as of: July 10, 2023               Content Version: 14.0  © 1962-3175 [employer].   Care instructions adapted under license by [email or web address]. If you have questions about a medical condition or this instruction, always ask your healthcare professional. [employer],

## 2024-09-05 ENCOUNTER — ROUTINE PRENATAL (OUTPATIENT)
Dept: OBGYN CLINIC | Age: 29
End: 2024-09-05

## 2024-09-05 VITALS
BODY MASS INDEX: 22.86 KG/M2 | WEIGHT: 121 LBS | SYSTOLIC BLOOD PRESSURE: 118 MMHG | HEART RATE: 90 BPM | DIASTOLIC BLOOD PRESSURE: 77 MMHG

## 2024-09-05 DIAGNOSIS — B00.9 HSV-2 INFECTION: ICD-10-CM

## 2024-09-05 DIAGNOSIS — Z3A.36 36 WEEKS GESTATION OF PREGNANCY: Primary | ICD-10-CM

## 2024-09-05 DIAGNOSIS — Z34.03 ENCOUNTER FOR PRENATAL CARE OF FIRST PREGNANCY, THIRD TRIMESTER: ICD-10-CM

## 2024-09-05 PROCEDURE — 0502F SUBSEQUENT PRENATAL CARE: CPT | Performed by: ADVANCED PRACTICE MIDWIFE

## 2024-09-05 NOTE — PROGRESS NOTES
Pt presents today for routine prenatal visit. Pt denies vaginal bleeding, cramping, or leaking of fluid +fetal movement.     Pt c/o swelling in legs/feet.

## 2024-09-05 NOTE — PROGRESS NOTES
[facility] Prenatal Office Note  Subjective:  Rhonda Vasquez is here for a return obstetrical visit. Today she is 36w1d weeks EGA.  Pt does feel fetal movement regularly. Denies headaches, RUQ pain, or visual changes. Denies vaginal bleeding or leaking of fluid.      Problems/Complaints today:  Swelling  HSV II  Anemia    Objective:  Mother's Prenatal Vitals  BP: 118/77  Weight - Scale: 54.9 kg (121 lb)  Pulse: 90  Patient Position: Sitting  Prenatal Fetal Information  Fundal Height (cm): 36 cm  Fetal HR: 140  Movement: Present  Cervical Exam  Dilation (cm): 1  Effacement: 80  Station: 0  Station (Labor Curve Graph): 5  Dil/Eff/Sta  Dilation (cm): 1  Effacement: 80  Station: 0  Pt is A&Ox3, in no acute distress. Normocephalic, atraumatic. PERRL. Resp even and non-labored. Skin pink, warm & dry. Gravid abdomen. TORRES's well. Gait steady.   Assessment:    IUP at 36w1d wks      Diagnosis Orders   1. 36 weeks gestation of pregnancy        2. Encounter for prenatal care of first pregnancy, third trimester        3. HSV-2 infection          Plan:  Problems/Complaints Management Plan:  Swelling- Discussed adequate hydration and rest. BP WNL.   HSV II- Pt desires to increase dose d/t being closer to delivery. Take  500mg x2 daily.   Continue current PNV dose.  Routine OB Management Plan:  Pt counseled on balanced nutrition, adequate fluid intake, taking PNV daily, and exercise along with GHTN precautions, Kick count, and  labor  Continue with routine prenatal care.   surveillance not indicated  RTC in 1 wks for prenatal visit    MEDICATIONS:  No orders of the defined types were placed in this encounter.    ORDERS:  No orders of the defined types were placed in this encounter.        I Samina Layne LPN, am scribing for and in the presence of Breanne Robledo CNM  24  10:51 AM CDT     I have seen and examined the patient independently.  I reviewed all laboratory and imaging studies that are relevant.  I have

## 2024-09-10 SDOH — ECONOMIC STABILITY: FOOD INSECURITY: WITHIN THE PAST 12 MONTHS, YOU WORRIED THAT YOUR FOOD WOULD RUN OUT BEFORE YOU GOT MONEY TO BUY MORE.: NEVER TRUE

## 2024-09-10 SDOH — ECONOMIC STABILITY: FOOD INSECURITY: WITHIN THE PAST 12 MONTHS, THE FOOD YOU BOUGHT JUST DIDN'T LAST AND YOU DIDN'T HAVE MONEY TO GET MORE.: NEVER TRUE

## 2024-09-10 SDOH — ECONOMIC STABILITY: INCOME INSECURITY: HOW HARD IS IT FOR YOU TO PAY FOR THE VERY BASICS LIKE FOOD, HOUSING, MEDICAL CARE, AND HEATING?: NOT HARD AT ALL

## 2024-09-12 ENCOUNTER — ROUTINE PRENATAL (OUTPATIENT)
Dept: OBGYN CLINIC | Age: 29
End: 2024-09-12

## 2024-09-12 VITALS
HEART RATE: 76 BPM | BODY MASS INDEX: 23.32 KG/M2 | DIASTOLIC BLOOD PRESSURE: 76 MMHG | WEIGHT: 123.4 LBS | SYSTOLIC BLOOD PRESSURE: 123 MMHG

## 2024-09-12 DIAGNOSIS — Z3A.37 37 WEEKS GESTATION OF PREGNANCY: Primary | ICD-10-CM

## 2024-09-12 DIAGNOSIS — B00.9 HSV-2 INFECTION: ICD-10-CM

## 2024-09-12 DIAGNOSIS — Z34.03 ENCOUNTER FOR PRENATAL CARE OF FIRST PREGNANCY, THIRD TRIMESTER: ICD-10-CM

## 2024-09-12 PROCEDURE — 0502F SUBSEQUENT PRENATAL CARE: CPT | Performed by: ADVANCED PRACTICE MIDWIFE

## 2024-09-19 ENCOUNTER — ROUTINE PRENATAL (OUTPATIENT)
Dept: OBGYN CLINIC | Age: 29
End: 2024-09-19

## 2024-09-19 VITALS
HEART RATE: 98 BPM | SYSTOLIC BLOOD PRESSURE: 124 MMHG | DIASTOLIC BLOOD PRESSURE: 82 MMHG | BODY MASS INDEX: 23.43 KG/M2 | WEIGHT: 124 LBS

## 2024-09-19 DIAGNOSIS — Z3A.38 38 WEEKS GESTATION OF PREGNANCY: Primary | ICD-10-CM

## 2024-09-19 DIAGNOSIS — Z34.03 ENCOUNTER FOR PRENATAL CARE OF FIRST PREGNANCY, THIRD TRIMESTER: ICD-10-CM

## 2024-09-19 DIAGNOSIS — Z86.19 HISTORY OF HERPES GENITALIS: ICD-10-CM

## 2024-09-19 PROCEDURE — 0502F SUBSEQUENT PRENATAL CARE: CPT | Performed by: ADVANCED PRACTICE MIDWIFE

## 2024-09-24 ENCOUNTER — ROUTINE PRENATAL (OUTPATIENT)
Dept: OBGYN CLINIC | Age: 29
End: 2024-09-24

## 2024-09-24 VITALS
HEART RATE: 102 BPM | BODY MASS INDEX: 22.48 KG/M2 | DIASTOLIC BLOOD PRESSURE: 84 MMHG | WEIGHT: 119 LBS | SYSTOLIC BLOOD PRESSURE: 124 MMHG

## 2024-09-24 DIAGNOSIS — Z34.03 ENCOUNTER FOR PRENATAL CARE OF FIRST PREGNANCY, THIRD TRIMESTER: Primary | ICD-10-CM

## 2024-09-24 DIAGNOSIS — Z86.19 HISTORY OF HERPES GENITALIS: ICD-10-CM

## 2024-09-24 DIAGNOSIS — Z3A.38 38 WEEKS GESTATION OF PREGNANCY: ICD-10-CM

## 2024-09-24 PROCEDURE — 0502F SUBSEQUENT PRENATAL CARE: CPT | Performed by: ADVANCED PRACTICE MIDWIFE

## 2024-09-25 ENCOUNTER — HOSPITAL ENCOUNTER (OUTPATIENT)
Age: 29
Discharge: HOME | DRG: 998 | End: 2024-09-25
Attending: ADVANCED PRACTICE MIDWIFE | Admitting: ADVANCED PRACTICE MIDWIFE
Payer: COMMERCIAL

## 2024-09-25 VITALS
SYSTOLIC BLOOD PRESSURE: 113 MMHG | TEMPERATURE: 98 F | BODY MASS INDEX: 21.9 KG/M2 | HEART RATE: 85 BPM | RESPIRATION RATE: 16 BRPM | HEIGHT: 62 IN | DIASTOLIC BLOOD PRESSURE: 66 MMHG | WEIGHT: 119 LBS

## 2024-09-25 PROBLEM — Z3A.39 39 WEEKS GESTATION OF PREGNANCY: Status: ACTIVE | Noted: 2024-09-25

## 2024-09-25 LAB
AMNISURE, POC: NEGATIVE
Lab: NORMAL
NEGATIVE QC PASS/FAIL: NORMAL
POSITIVE QC PASS/FAIL: NORMAL

## 2024-09-25 PROCEDURE — 99214 OFFICE O/P EST MOD 30 MIN: CPT

## 2024-09-26 ENCOUNTER — HOSPITAL ENCOUNTER (INPATIENT)
Age: 29
LOS: 2 days | Discharge: HOME | DRG: 998 | End: 2024-09-28
Attending: ADVANCED PRACTICE MIDWIFE | Admitting: ADVANCED PRACTICE MIDWIFE
Payer: COMMERCIAL

## 2024-09-26 ENCOUNTER — ANESTHESIA (OUTPATIENT)
Dept: LABOR AND DELIVERY | Age: 29
End: 2024-09-26
Payer: COMMERCIAL

## 2024-09-26 ENCOUNTER — ANESTHESIA EVENT (OUTPATIENT)
Dept: LABOR AND DELIVERY | Age: 29
End: 2024-09-26
Payer: COMMERCIAL

## 2024-09-26 PROBLEM — Z37.9 NORMAL LABOR: Status: ACTIVE | Noted: 2024-09-26

## 2024-09-26 LAB
ABO/RH: NORMAL
AMPHET UR QL SCN: NEGATIVE
ANTIBODY SCREEN: NORMAL
BARBITURATES UR QL SCN: NEGATIVE
BENZODIAZ UR QL SCN: NEGATIVE
BUPRENORPHINE URINE: NEGATIVE
CANNABINOIDS UR QL SCN: NEGATIVE
COCAINE UR QL SCN: NEGATIVE
DRUG SCREEN COMMENT UR-IMP: NORMAL
ERYTHROCYTE [DISTWIDTH] IN BLOOD BY AUTOMATED COUNT: 13.7 % (ref 11.5–14.5)
FENTANYL SCREEN, URINE: NEGATIVE
HCT VFR BLD AUTO: 34.5 % (ref 37–47)
HGB BLD-MCNC: 10.7 G/DL (ref 12–16)
MCH RBC QN AUTO: 28 PG (ref 27–31)
MCHC RBC AUTO-ENTMCNC: 31 G/DL (ref 33–37)
MCV RBC AUTO: 90.3 FL (ref 81–99)
METHADONE UR QL SCN: NEGATIVE
METHAMPHETAMINE, URINE: NEGATIVE
OPIATES UR QL SCN: NEGATIVE
OXYCODONE UR QL SCN: NEGATIVE
PCP UR QL SCN: NEGATIVE
PLATELET # BLD AUTO: 238 K/UL (ref 130–400)
PMV BLD AUTO: 12.5 FL (ref 9.4–12.3)
RBC # BLD AUTO: 3.82 M/UL (ref 4.2–5.4)
RPR SER QL: NORMAL
TRICYCLIC ANTIDEPRESSANTS, UR: NEGATIVE
WBC # BLD AUTO: 19.3 K/UL (ref 4.8–10.8)

## 2024-09-26 PROCEDURE — 86592 SYPHILIS TEST NON-TREP QUAL: CPT

## 2024-09-26 PROCEDURE — 86850 RBC ANTIBODY SCREEN: CPT

## 2024-09-26 PROCEDURE — 2580000003 HC RX 258: Performed by: ADVANCED PRACTICE MIDWIFE

## 2024-09-26 PROCEDURE — 36415 COLL VENOUS BLD VENIPUNCTURE: CPT

## 2024-09-26 PROCEDURE — 6360000002 HC RX W HCPCS: Performed by: ADVANCED PRACTICE MIDWIFE

## 2024-09-26 PROCEDURE — 86901 BLOOD TYPING SEROLOGIC RH(D): CPT

## 2024-09-26 PROCEDURE — 6370000000 HC RX 637 (ALT 250 FOR IP): Performed by: ADVANCED PRACTICE MIDWIFE

## 2024-09-26 PROCEDURE — 3700000025 EPIDURAL BLOCK: Performed by: NURSE ANESTHETIST, CERTIFIED REGISTERED

## 2024-09-26 PROCEDURE — 1220000000 HC SEMI PRIVATE OB R&B

## 2024-09-26 PROCEDURE — 85027 COMPLETE CBC AUTOMATED: CPT

## 2024-09-26 PROCEDURE — 80307 DRUG TEST PRSMV CHEM ANLYZR: CPT

## 2024-09-26 PROCEDURE — 0HQ9XZZ REPAIR PERINEUM SKIN, EXTERNAL APPROACH: ICD-10-PCS | Performed by: ADVANCED PRACTICE MIDWIFE

## 2024-09-26 PROCEDURE — 59400 OBSTETRICAL CARE: CPT | Performed by: ADVANCED PRACTICE MIDWIFE

## 2024-09-26 PROCEDURE — 6360000002 HC RX W HCPCS: Performed by: NURSE ANESTHETIST, CERTIFIED REGISTERED

## 2024-09-26 PROCEDURE — G0480 DRUG TEST DEF 1-7 CLASSES: HCPCS

## 2024-09-26 PROCEDURE — 86900 BLOOD TYPING SEROLOGIC ABO: CPT

## 2024-09-26 PROCEDURE — 7200000001 HC VAGINAL DELIVERY

## 2024-09-26 PROCEDURE — 2500000003 HC RX 250 WO HCPCS: Performed by: NURSE ANESTHETIST, CERTIFIED REGISTERED

## 2024-09-26 RX ORDER — DOCUSATE SODIUM 100 MG/1
100 CAPSULE, LIQUID FILLED ORAL 2 TIMES DAILY
Status: DISCONTINUED | OUTPATIENT
Start: 2024-09-26 | End: 2024-09-28 | Stop reason: HOSPADM

## 2024-09-26 RX ORDER — SODIUM CHLORIDE, SODIUM LACTATE, POTASSIUM CHLORIDE, AND CALCIUM CHLORIDE .6; .31; .03; .02 G/100ML; G/100ML; G/100ML; G/100ML
500 INJECTION, SOLUTION INTRAVENOUS PRN
Status: DISCONTINUED | OUTPATIENT
Start: 2024-09-26 | End: 2024-09-28 | Stop reason: HOSPADM

## 2024-09-26 RX ORDER — SODIUM CHLORIDE 0.9 % (FLUSH) 0.9 %
5-40 SYRINGE (ML) INJECTION EVERY 12 HOURS SCHEDULED
Status: DISCONTINUED | OUTPATIENT
Start: 2024-09-26 | End: 2024-09-26 | Stop reason: HOSPADM

## 2024-09-26 RX ORDER — ACETAMINOPHEN 500 MG
1000 TABLET ORAL EVERY 8 HOURS
Status: DISCONTINUED | OUTPATIENT
Start: 2024-09-26 | End: 2024-09-28 | Stop reason: HOSPADM

## 2024-09-26 RX ORDER — MISOPROSTOL 200 UG/1
400 TABLET ORAL PRN
Status: DISCONTINUED | OUTPATIENT
Start: 2024-09-26 | End: 2024-09-28 | Stop reason: HOSPADM

## 2024-09-26 RX ORDER — IBUPROFEN 400 MG/1
800 TABLET, FILM COATED ORAL EVERY 8 HOURS
Status: DISCONTINUED | OUTPATIENT
Start: 2024-09-26 | End: 2024-09-28 | Stop reason: HOSPADM

## 2024-09-26 RX ORDER — METHYLERGONOVINE MALEATE 0.2 MG/ML
200 INJECTION INTRAVENOUS PRN
Status: DISCONTINUED | OUTPATIENT
Start: 2024-09-26 | End: 2024-09-28 | Stop reason: HOSPADM

## 2024-09-26 RX ORDER — CARBOPROST TROMETHAMINE 250 UG/ML
250 INJECTION, SOLUTION INTRAMUSCULAR PRN
Status: DISCONTINUED | OUTPATIENT
Start: 2024-09-26 | End: 2024-09-28 | Stop reason: HOSPADM

## 2024-09-26 RX ORDER — ACETAMINOPHEN 325 MG/1
650 TABLET ORAL EVERY 4 HOURS PRN
Status: DISCONTINUED | OUTPATIENT
Start: 2024-09-26 | End: 2024-09-26 | Stop reason: HOSPADM

## 2024-09-26 RX ORDER — TERBUTALINE SULFATE 1 MG/ML
0.25 INJECTION, SOLUTION SUBCUTANEOUS
Status: DISCONTINUED | OUTPATIENT
Start: 2024-09-26 | End: 2024-09-26 | Stop reason: HOSPADM

## 2024-09-26 RX ORDER — NALOXONE HYDROCHLORIDE 0.4 MG/ML
INJECTION, SOLUTION INTRAMUSCULAR; INTRAVENOUS; SUBCUTANEOUS PRN
Status: DISCONTINUED | OUTPATIENT
Start: 2024-09-26 | End: 2024-09-26 | Stop reason: HOSPADM

## 2024-09-26 RX ORDER — SODIUM CHLORIDE 0.9 % (FLUSH) 0.9 %
5-40 SYRINGE (ML) INJECTION EVERY 12 HOURS SCHEDULED
Status: DISCONTINUED | OUTPATIENT
Start: 2024-09-26 | End: 2024-09-28 | Stop reason: HOSPADM

## 2024-09-26 RX ORDER — LIDOCAINE HCL/EPINEPHRINE/PF 2%-1:200K
VIAL (ML) INJECTION
Status: DISCONTINUED | OUTPATIENT
Start: 2024-09-26 | End: 2024-09-26 | Stop reason: SDUPTHER

## 2024-09-26 RX ORDER — TRANEXAMIC ACID 10 MG/ML
1000 INJECTION, SOLUTION INTRAVENOUS
Status: ACTIVE | OUTPATIENT
Start: 2024-09-26 | End: 2024-09-27

## 2024-09-26 RX ORDER — ONDANSETRON 2 MG/ML
4 INJECTION INTRAMUSCULAR; INTRAVENOUS EVERY 6 HOURS PRN
Status: DISCONTINUED | OUTPATIENT
Start: 2024-09-26 | End: 2024-09-28 | Stop reason: HOSPADM

## 2024-09-26 RX ORDER — SODIUM CHLORIDE 0.9 % (FLUSH) 0.9 %
5-40 SYRINGE (ML) INJECTION PRN
Status: DISCONTINUED | OUTPATIENT
Start: 2024-09-26 | End: 2024-09-28 | Stop reason: HOSPADM

## 2024-09-26 RX ORDER — SODIUM CHLORIDE 9 MG/ML
INJECTION, SOLUTION INTRAVENOUS PRN
Status: DISCONTINUED | OUTPATIENT
Start: 2024-09-26 | End: 2024-09-28 | Stop reason: HOSPADM

## 2024-09-26 RX ORDER — SODIUM CHLORIDE, SODIUM LACTATE, POTASSIUM CHLORIDE, CALCIUM CHLORIDE 600; 310; 30; 20 MG/100ML; MG/100ML; MG/100ML; MG/100ML
INJECTION, SOLUTION INTRAVENOUS CONTINUOUS
Status: DISCONTINUED | OUTPATIENT
Start: 2024-09-26 | End: 2024-09-28 | Stop reason: HOSPADM

## 2024-09-26 RX ORDER — ROPIVACAINE HYDROCHLORIDE 2 MG/ML
INJECTION, SOLUTION EPIDURAL; INFILTRATION; PERINEURAL
Status: DISCONTINUED | OUTPATIENT
Start: 2024-09-26 | End: 2024-09-26 | Stop reason: SDUPTHER

## 2024-09-26 RX ORDER — EPHEDRINE SULFATE/0.9% NACL/PF 25 MG/5 ML
10 SYRINGE (ML) INTRAVENOUS
Status: DISCONTINUED | OUTPATIENT
Start: 2024-09-26 | End: 2024-09-26 | Stop reason: HOSPADM

## 2024-09-26 RX ORDER — EPHEDRINE SULFATE/0.9% NACL/PF 25 MG/5 ML
5 SYRINGE (ML) INTRAVENOUS PRN
Status: DISCONTINUED | OUTPATIENT
Start: 2024-09-27 | End: 2024-09-26 | Stop reason: HOSPADM

## 2024-09-26 RX ORDER — SODIUM CHLORIDE 0.9 % (FLUSH) 0.9 %
5-40 SYRINGE (ML) INJECTION PRN
Status: DISCONTINUED | OUTPATIENT
Start: 2024-09-26 | End: 2024-09-26 | Stop reason: HOSPADM

## 2024-09-26 RX ORDER — ROPIVACAINE HYDROCHLORIDE 2 MG/ML
9 INJECTION, SOLUTION EPIDURAL; INFILTRATION; PERINEURAL CONTINUOUS
Status: DISCONTINUED | OUTPATIENT
Start: 2024-09-26 | End: 2024-09-26 | Stop reason: HOSPADM

## 2024-09-26 RX ORDER — ONDANSETRON 4 MG/1
4 TABLET, ORALLY DISINTEGRATING ORAL EVERY 6 HOURS PRN
Status: DISCONTINUED | OUTPATIENT
Start: 2024-09-26 | End: 2024-09-28 | Stop reason: HOSPADM

## 2024-09-26 RX ORDER — SODIUM CHLORIDE 9 MG/ML
25 INJECTION, SOLUTION INTRAVENOUS PRN
Status: DISCONTINUED | OUTPATIENT
Start: 2024-09-26 | End: 2024-09-26 | Stop reason: HOSPADM

## 2024-09-26 RX ORDER — MISOPROSTOL 200 UG/1
800 TABLET ORAL PRN
Status: DISCONTINUED | OUTPATIENT
Start: 2024-09-26 | End: 2024-09-28 | Stop reason: HOSPADM

## 2024-09-26 RX ADMIN — Medication 2 MILLI-UNITS/MIN: at 14:21

## 2024-09-26 RX ADMIN — ROPIVACAINE HYDROCHLORIDE 9 ML/HR: 2 INJECTION, SOLUTION EPIDURAL; INFILTRATION at 07:57

## 2024-09-26 RX ADMIN — LIDOCAINE HYDROCHLORIDE,EPINEPHRINE BITARTRATE 5 ML: 20; .005 INJECTION, SOLUTION EPIDURAL; INFILTRATION; INTRACAUDAL; PERINEURAL at 07:46

## 2024-09-26 RX ADMIN — SODIUM CHLORIDE, SODIUM LACTATE, POTASSIUM CHLORIDE, AND CALCIUM CHLORIDE: 600; 310; 30; 20 INJECTION, SOLUTION INTRAVENOUS at 09:07

## 2024-09-26 RX ADMIN — IBUPROFEN 800 MG: 400 TABLET, FILM COATED ORAL at 15:17

## 2024-09-26 RX ADMIN — ACETAMINOPHEN 1000 MG: 500 TABLET ORAL at 19:45

## 2024-09-26 RX ADMIN — SODIUM CHLORIDE, POTASSIUM CHLORIDE, SODIUM LACTATE AND CALCIUM CHLORIDE 500 ML: 600; 310; 30; 20 INJECTION, SOLUTION INTRAVENOUS at 07:23

## 2024-09-26 RX ADMIN — DOCUSATE SODIUM 100 MG: 100 CAPSULE, LIQUID FILLED ORAL at 19:45

## 2024-09-26 SDOH — ECONOMIC STABILITY: INCOME INSECURITY: IN THE PAST 12 MONTHS, HAS THE ELECTRIC, GAS, OIL, OR WATER COMPANY THREATENED TO SHUT OFF SERVICE IN YOUR HOME?: NO

## 2024-09-26 SDOH — ECONOMIC STABILITY: FOOD INSECURITY: WITHIN THE PAST 12 MONTHS, YOU WORRIED THAT YOUR FOOD WOULD RUN OUT BEFORE YOU GOT MONEY TO BUY MORE.: NEVER TRUE

## 2024-09-26 SDOH — ECONOMIC STABILITY: INCOME INSECURITY: HOW HARD IS IT FOR YOU TO PAY FOR THE VERY BASICS LIKE FOOD, HOUSING, MEDICAL CARE, AND HEATING?: NOT HARD AT ALL

## 2024-09-26 ASSESSMENT — PATIENT HEALTH QUESTIONNAIRE - PHQ9
1. LITTLE INTEREST OR PLEASURE IN DOING THINGS: NOT AT ALL
SUM OF ALL RESPONSES TO PHQ QUESTIONS 1-9: 0
2. FEELING DOWN, DEPRESSED OR HOPELESS: NOT AT ALL
SUM OF ALL RESPONSES TO PHQ QUESTIONS 1-9: 0
SUM OF ALL RESPONSES TO PHQ9 QUESTIONS 1 & 2: 0
SUM OF ALL RESPONSES TO PHQ QUESTIONS 1-9: 0
SUM OF ALL RESPONSES TO PHQ QUESTIONS 1-9: 0

## 2024-09-26 ASSESSMENT — PAIN SCALES - GENERAL
PAINLEVEL_OUTOF10: 3
PAINLEVEL_OUTOF10: 2

## 2024-09-26 ASSESSMENT — PAIN DESCRIPTION - DESCRIPTORS: DESCRIPTORS: DISCOMFORT

## 2024-09-26 ASSESSMENT — PAIN DESCRIPTION - LOCATION
LOCATION: PERINEUM
LOCATION: HEAD

## 2024-09-26 ASSESSMENT — PAIN DESCRIPTION - ORIENTATION: ORIENTATION: LOWER

## 2024-09-26 NOTE — DISCHARGE INSTRUCTIONS
LABOR AND DELIVERY - OBSERVATION DISCHARGE INSTRUCTIONS    TERM LABOR: RETURN TO HOSPITAL IF:  _X_There is a leaking or sudden gush of fluid from the vagina (note color, odor, time and amount of fluid)    _X_ You have bright red vaginal bleeding    _X_You begin to have regular contractions, occuring every 3-5 minutes, each contraction lasting 45-60 seconds for one hour. Time contractions from beginning of one to the beginning of the next. True contractions have a regular rate and become progressively closer. They are not changed by position change and are usually more uncomfortable when walking.    PRE-TERM LABOR  __Your gestational age is *** weeks: term pregnancy starts at 37 weeks.  __Fill your prescription and take as directed.  __Bed rest (left lying position is best).  __Refrain from sexual intercourse until you see your physician again.  __No heavy lifting or strenuous activity.    RETURN TO HOSPITAL IF:  __Contractions occur 3-4 in any hour (every 10-15 minutes), maybe with or without pain.  __Rhythmic or constant menstrual-like cramps  __Rhythmic or constant lower, dull backache may radiate to the sides or front. Increase or change in vaginal discharge, may be watery, pink, red or brown-tinged.    PRE-ECLAMPSIA  __Bed rest, left side lying position  __Elevate legs while sitting  __Maintain quiet, peaceful environment  __Eat well-balanced meals, no added salt, avoid processed lunch meats, canned soups, potato chips, etc.    SYMPTOMS THAT REQUIRE PROMPT REPORTING:  __Rapid gain in weight  __Persistent or severe headache  __Visual disturbances (spots, blurred)  __Nausea/vomiting, with or without upper abdominal pain.  __Increase or persistent swelling (face puffiness, hands, legs, ankles)  __Decreased urinary output  __Drowsiness listlessness    NAUSEA/VOMITING HYPEREMESIS  __Eat small, frequent meals instead of three large meals  __Drink liquids (such as 7-up or ginger ale) between meals instead of with  meals  __Eat a few crackers or toast before getting out of bed in the morning    URINARY TRACT INFECTION  __Fill your prescription and take as directed  __Drink 8-10 glasses of water, juice or decaffeinated beverages a day.  __Take two regular strength Tylenol every 4 hours as needed for pain or fever (NO ASPIRIN PRODUCTS)  __Cleanse vaginal area from front to back  __Completely empty bladder and avoid postponing urination  __Notify your physician of elevated temperature      LOW LYING/MARGINAL PLACENTA PREVIA  __No sexual intercourse  __No douching or tampon use  __No heavy lifting or strenuous activity  __No long trips without physician's consent  __Limited activity for __________________    RETURN TO HOSPITAL IMMEDIATELY IF:  __Vaginal bleeding, usually bright red and painless. May be intermittent, may come in gushes or continuous.    ABDOMINAL POST-TRAUMA INSTRUCTIONS  __Limited activity for __hours  __It is important to note baby activity, evidence on pre-term labor or bleeding and tight feeling abdomen  __If you notice a decrease in fetal movement , notify your physician  __Return to hospital IMMEDIATELY if vaginal bleeding starts, abdominal tenderness, or pain, a rigid/board-like appearance of abdomen    OTHER INSTRUCTIONS  _X_Keep appointment to see your attending physician.       NOTE: If you do not begin to feel better, or you have any questions, contact your physician or call [phone], or return to the hospital.

## 2024-09-26 NOTE — PROGRESS NOTES
Patient arrived from ER eman[facility]. Denies leaking fluid or vaginal bleeding. Reports positive fetal movement. EFM and toco applied to soft, non tender abdomen.

## 2024-09-26 NOTE — FLOWSHEET NOTE
Report called to CHRISTOPHER Robledo. First BP of 132/90 reported, along with subsequent BP's that are WNL. States to recheck patient in an hour and if no cervical change she may be discharged.

## 2024-09-26 NOTE — PROGRESS NOTES
Provider CHRISTOPHER Robledo notified about patient's arrival. Informed of uterine activity and cervical exam. Provider instructed to admit patient for delivery. Discussed this with patient, no questions or concerns at this time.

## 2024-09-26 NOTE — H&P
[facility]    OB History and Physical    Provider: UTE Fuentes CNM  Date: 2024            8:17 AM    Patient Name: Rhonda Vasquez  Patient : 1995  MRN: [id number]   Room/Bed: 0222/0222-01    SUBJECTIVE:    CHIEF COMPLAINT:  contractions  No chief complaint on file.      HISTORY OF PRESENT ILLNESS:      Rhonda Manriquez a 29 y.o. female at 39w1d presents with a chief complaint as above and is being admitted for active phase labor.     Contractions: Yes  Rupture of membranes: No  Vaginal bleeding: No    REVIEW OF SYSTEMS:  A comprehensive review of systems was negative except for what was noted in the HPI.     OBJECTIVE:     Estimated Due Date:   Estimated Date of Delivery: 10/2/24   Patient's last menstrual period was 2024 (approximate).      PREGNANCY RISK FACTORS:       Patient Active Problem List   Diagnosis    History of herpes genitalis    39 weeks gestation of pregnancy    Normal labor        Steroids:  no    PAST OB HISTORY:  OB History    Para Term  AB Living   1 0 0 0 0 0   SAB IAB Ectopic Molar Multiple Live Births   0 0 0 0 0 0      # Outcome Date GA Lbr Randell/2nd Weight Sex Type Anes PTL Lv   1 Current                    Past Medical History:    No past medical history on file.    Past Surgical History:    No past surgical history on file.    Allergies:    Patient has no known allergies.    Social History:    Social History     Socioeconomic History    Marital status: Single     Spouse name: Not on file    Number of children: Not on file    Years of education: Not on file    Highest education level: Not on file   Occupational History    Not on file   Tobacco Use    Smoking status: Never    Smokeless tobacco: Never   Substance and Sexual Activity    Alcohol use: No    Drug use: No    Sexual activity: Not on file   Other Topics Concern    Not on file   Social History Narrative    Not on file     Social Determinants of Health     Financial  no calf tenderness, non edematous, DTRs: normal    Pelvic Exam:  FETALPRESENTATION:Cephalic  DILATION:  6 cm  EFFACEMENT:   90%  STATION:  0 cm  CONSISTENCY:  soft  POSITION:  mid    MEMBRANES:  Intact                                FETAL HEART RATE:    Baseline: 140      Variability: moderate      Accelerations: present      Decelerations: absent      FHR: Category: 1          CONTRACTIONS:   Frequency: 2-3 minutes  Duration: 60-80 seconds  Intensity: Strong by palpation  Resting tone: palpates soft between contractions, adequate resting tone     Lab Results:   Blood Type/Rh:    ABO/Rh   Date Value Ref Range Status   2024 O POS  Final     Antibody Screen:    Antibody Screen   Date Value Ref Range Status   2024 NEG  Final     Hemoglobin:   Hemoglobin   Date Value Ref Range Status   2024 10.7 (L) 12.0 - 16.0 g/dL Final     Hematocrit:   Hematocrit   Date Value Ref Range Status   2024 34.5 (L) 37.0 - 47.0 % Final     Platelet Count:   Platelets   Date Value Ref Range Status   2024 238 130 - 400 K/uL Final     Hepatitis B Surface Antigen: Negative  Group B Strep:  Negative  RPR: Negative      ASSESSMENT/PLAN:  Rhonda Vasquez is a 29 y.o. female   At 39w1d    Other:   Admit to LDR with routine order set  Labor support prn  Pain management and epidural when active  GBS prophylaxis per policy  Anticipate vaginal delivery      Risks, benefits, alternatives and possible complications have been discussed in detail with the patient. Admission, and post admission procedures and expectations were discussed in detail. All questions were answered.    UTE Fuentes CNM

## 2024-09-26 NOTE — FLOWSHEET NOTE
Cervical exam unchanged, pt desires to go home at this time. Discharge instructions reviewed with patient, instructed her and significant other to return if ctx intensify, along with any vaginal bleeding or leaking of fluid, or decreased fetal movement. Pt verbalized understanding.

## 2024-09-26 NOTE — FLOWSHEET NOTE
Pt here with complaints of contractions q 6-7 minutes, rates them a 6/10 on pain scale. Pt reports +fetal movement, denies any vaginal bleeding. She reports feeling like she \"peed\" on herself but wasn't sure if her water broke, so amnisure was collected. EFM and toco applied to soft, non tender abd.

## 2024-09-26 NOTE — FLOWSHEET NOTE
Pt ambulated to bathroom. Pt able to void and zaki care completed. Pt ambulated to postpartum room gait steady. Pt denies any needs at this time

## 2024-09-27 LAB
BASOPHILS # BLD: 0.1 K/UL (ref 0–0.2)
BASOPHILS NFR BLD: 0.3 % (ref 0–1)
EOSINOPHIL # BLD: 0.1 K/UL (ref 0–0.6)
EOSINOPHIL NFR BLD: 0.8 % (ref 0–5)
ERYTHROCYTE [DISTWIDTH] IN BLOOD BY AUTOMATED COUNT: 13.9 % (ref 11.5–14.5)
HCT VFR BLD AUTO: 32.2 % (ref 37–47)
HGB BLD-MCNC: 10 G/DL (ref 12–16)
IMM GRANULOCYTES # BLD: 0.1 K/UL
LYMPHOCYTES # BLD: 1.5 K/UL (ref 1.1–4.5)
LYMPHOCYTES NFR BLD: 9.6 % (ref 20–40)
MCH RBC QN AUTO: 28 PG (ref 27–31)
MCHC RBC AUTO-ENTMCNC: 31.1 G/DL (ref 33–37)
MCV RBC AUTO: 90.2 FL (ref 81–99)
MONOCYTES # BLD: 0.9 K/UL (ref 0–0.9)
MONOCYTES NFR BLD: 5.5 % (ref 0–10)
NEUTROPHILS # BLD: 12.8 K/UL (ref 1.5–7.5)
NEUTS SEG NFR BLD: 83.3 % (ref 50–65)
PLATELET # BLD AUTO: 216 K/UL (ref 130–400)
PMV BLD AUTO: 11.9 FL (ref 9.4–12.3)
RBC # BLD AUTO: 3.57 M/UL (ref 4.2–5.4)
WBC # BLD AUTO: 15.4 K/UL (ref 4.8–10.8)

## 2024-09-27 PROCEDURE — 6370000000 HC RX 637 (ALT 250 FOR IP): Performed by: ADVANCED PRACTICE MIDWIFE

## 2024-09-27 PROCEDURE — 36415 COLL VENOUS BLD VENIPUNCTURE: CPT

## 2024-09-27 PROCEDURE — 1220000000 HC SEMI PRIVATE OB R&B

## 2024-09-27 PROCEDURE — 99024 POSTOP FOLLOW-UP VISIT: CPT | Performed by: NURSE PRACTITIONER

## 2024-09-27 PROCEDURE — 85025 COMPLETE CBC W/AUTO DIFF WBC: CPT

## 2024-09-27 RX ADMIN — ACETAMINOPHEN 1000 MG: 500 TABLET ORAL at 18:45

## 2024-09-27 RX ADMIN — WITCH HAZEL: 500 SOLUTION RECTAL; TOPICAL at 18:44

## 2024-09-27 RX ADMIN — DOCUSATE SODIUM 100 MG: 100 CAPSULE, LIQUID FILLED ORAL at 08:03

## 2024-09-27 RX ADMIN — IBUPROFEN 800 MG: 400 TABLET, FILM COATED ORAL at 16:17

## 2024-09-27 RX ADMIN — IBUPROFEN 800 MG: 400 TABLET, FILM COATED ORAL at 08:03

## 2024-09-27 RX ADMIN — ACETAMINOPHEN 1000 MG: 500 TABLET ORAL at 11:01

## 2024-09-27 RX ADMIN — DOCUSATE SODIUM 100 MG: 100 CAPSULE, LIQUID FILLED ORAL at 19:48

## 2024-09-27 RX ADMIN — ACETAMINOPHEN 1000 MG: 500 TABLET ORAL at 04:24

## 2024-09-27 ASSESSMENT — PAIN DESCRIPTION - ORIENTATION
ORIENTATION: LOWER

## 2024-09-27 ASSESSMENT — PAIN DESCRIPTION - DESCRIPTORS
DESCRIPTORS: CRAMPING

## 2024-09-27 ASSESSMENT — PAIN SCALES - GENERAL
PAINLEVEL_OUTOF10: 2
PAINLEVEL_OUTOF10: 2
PAINLEVEL_OUTOF10: 1
PAINLEVEL_OUTOF10: 5

## 2024-09-27 ASSESSMENT — PAIN DESCRIPTION - LOCATION
LOCATION: ABDOMEN;PERINEUM
LOCATION: ABDOMEN;PERINEUM
LOCATION: ABDOMEN
LOCATION: PERINEUM
LOCATION: ABDOMEN

## 2024-09-27 NOTE — CARE COORDINATION
Sadie provided written documentation for Community Resources based on the Pt SDOH scores and any additional community resources needed. These resources will be provided to the Pt at D/C. Electronically signed by Sadie Castro on 9/27/2024 at 6:08 AM

## 2024-09-27 NOTE — PROGRESS NOTES
Subjective:     Postpartum Day 1: Vaginal Delivery    Patient is a  The patient feels well. The patient denies emotional concerns. Pain is well controlled with current medications. The baby iswell. Baby is feeding via breast. Urinary output is adequate. The patient is ambulating well. The patient is tolerating a normal diet. Flatus has been passed.    Objective:      Patient Vitals for the past 8 hrs:   BP Temp Temp src Pulse Resp SpO2   24 0801 113/79 97.5 °F (36.4 °C) Oral 71 18 99 %   24 0446 122/82 97.8 °F (36.6 °C) Oral 74 -- 97 %     General:    alert, appears stated age, and cooperative   Bowel Sounds:  active   Lochia:  appropriate   Uterine Fundus:   firm   Episiotomy:  healing well, no significant drainage, no dehiscence, no significant erythema   DVT Evaluation:  No evidence of DVT seen on physical exam.     Lab Results   Component Value Date    WBC 15.4 (H) 2024    HGB 10.0 (L) 2024    HCT 32.2 (L) 2024    MCV 90.2 2024     2024     Assessment:     Status post vaginal delivery. doing well post vaginal    Plan:     Continue current care.

## 2024-09-27 NOTE — PROGRESS NOTES
Pt complain of perineum irritation from repair. Educated pt on how to use witch hazel pads and Dermoplast. She verbalized understanding. Recommended she change her position frequently while in bed or sitting.

## 2024-09-27 NOTE — DISCHARGE INSTRUCTIONS
POSTPARTUM EDUCATION / DISCHARGE PLANNING    Call Doctor:  1. If temp 100.4 or greater.  2. If foul smelling discharge.  3. If discharge changes from pink or yellow, back to red, and bleeding is heavier than a normal period.  4. If you pass large clots.   5. If abdominal incision starts to separate and/or starts to bleeding, is red and warm to touch or has drainage with a foul odor.  6. Report any pain, redness, or warmth of skin in calf of leg which could indicate a blood clot.    Crampin. Cramping is normal; uterus is returning to pre-pregnant state.  2. Moms of twins and mothers of more than one child and breast-feeding mothers will cramp more than first time moms.  3. For relief, place pillow under abdomen and lie on it to apply pressure. Walking may help.    Discharge:   1. Discharge will be dark for first few days (similar to menstrual flow). It will turn to pinkish brown after 2-3 days and creamy yellow for an additional week or two. Moderate flow-use of 4-8 pads daily.  2. Small clots are normal.    Episiotomy Care:  1. May use sitz bath 3-4 times daily.  2. Use analgesic foams or sprays or medicine as ordered.   3. Keep perineal area clean.  4. Continue to use zaki bottle after urinating and gently pat from front to back.  5. Stitches will dissolve on their own. If you have stitches, shower only for 2-4 weeks or as directed by your doctor to allow suture line to heal properly. No baths, hot tubs or swimming pools until told it is alright to do so by your doctor.    Abdominal Incision Care:  1. Leave open to air after original dressing is removed.   2. Clean incision with soapy water unless otherwise directed.    Return of Periods:  1. You should resume menstruating anywhere from 6-8 weeks after birth; up to 24 weeks if breast-feeding.  2. Breast feeding mothers may also resume menstruating during this time.    Breast:  1. ENGORGEMENT, NON-NURSING MOMS:  Wear supportive, well-fitting bra for two weeks,  about you. Tell them about your struggle.  Join a support group of new parents. No one can better understand the challenges of caring for a  than other new parents.            Sadie provided written documentation for Community Resources based on the Pt SDOH scores and any additional community resources needed. These resources will be provided to the Pt at D/C. Electronically signed by Sadie Castro on 2024 at 6:08 AM

## 2024-09-28 VITALS
RESPIRATION RATE: 16 BRPM | SYSTOLIC BLOOD PRESSURE: 116 MMHG | OXYGEN SATURATION: 99 % | DIASTOLIC BLOOD PRESSURE: 74 MMHG | TEMPERATURE: 97.5 F | HEART RATE: 50 BPM

## 2024-09-28 PROBLEM — Z86.19 HISTORY OF HERPES GENITALIS: Status: RESOLVED | Noted: 2024-09-19 | Resolved: 2024-09-28

## 2024-09-28 PROBLEM — Z3A.39 39 WEEKS GESTATION OF PREGNANCY: Status: RESOLVED | Noted: 2024-09-25 | Resolved: 2024-09-28

## 2024-09-28 PROCEDURE — 6370000000 HC RX 637 (ALT 250 FOR IP): Performed by: ADVANCED PRACTICE MIDWIFE

## 2024-09-28 RX ORDER — IBUPROFEN 800 MG/1
800 TABLET, FILM COATED ORAL EVERY 8 HOURS PRN
Qty: 60 TABLET | Refills: 0 | Status: SHIPPED | OUTPATIENT
Start: 2024-09-28

## 2024-09-28 RX ADMIN — IBUPROFEN 800 MG: 400 TABLET, FILM COATED ORAL at 00:08

## 2024-09-28 ASSESSMENT — PAIN DESCRIPTION - DESCRIPTORS: DESCRIPTORS: DISCOMFORT

## 2024-09-28 ASSESSMENT — PAIN - FUNCTIONAL ASSESSMENT: PAIN_FUNCTIONAL_ASSESSMENT: ACTIVITIES ARE NOT PREVENTED

## 2024-09-28 ASSESSMENT — PAIN DESCRIPTION - LOCATION: LOCATION: PERINEUM

## 2024-09-28 ASSESSMENT — PAIN SCALES - GENERAL: PAINLEVEL_OUTOF10: 2

## 2024-09-28 NOTE — PROGRESS NOTES
Postpartum day 2  Ros neg  Labs noted O pos  Exam unremarkable  A: stable   Plan to discharge pt home, instructions given

## 2024-09-28 NOTE — DISCHARGE SUMMARY
Obstetric Discharge Summary    Patient Name: Rhonda Vasquez  Patient : 1995  Room/Bed: [facility]  Primary Care Physician: Sera Andrea DTR  Admit Date: 2024  6:32 AM  MRN #: [id number]  [facility] #: [id number]    Admitting Diagnosis  Rhonda Vasquez is a 29 y.o. female  at 39w1d  Admitting DX: labor term   OB History          1    Para   1    Term   1       0    AB   0    Living   1         SAB   0    IAB   0    Ectopic   0    Molar        Multiple   0    Live Births   1              Patient Active Problem List   Diagnosis   (none) - all problems resolved or deleted         Reasons for Admission on 2024  6:32 AM  39 weeks gestation of pregnancy [Z3A.39]  No comment available  Onset of Labor    Prenatal Procedures  None    Intrapartum Procedures:  Spontaneous Vaginal Delivery: See Labor and Delivery Summary        Multiple birth?: No             Postpartum Procedures  None  Signs and symptoms of depression reviewed and evaluated: absent    Postpartum/Operative Complications:  none      [place] Data  Information for the patient's :  Giorgi Vasquez [[id number]]   male   Birth Weight: 3.3 kg (7 lb 4.4 oz)  Discharge With Mother  Complications: No    Discharge Diagnosis:  Rhonda Vasquez is a 29 y.o. female    39w1d  Delivered a Live Born male by Vaginal delivery.  1st degree (mucosa only    Discharge Information/Patient Instructions:     Medication List        START taking these medications      ibuprofen 800 MG tablet  Commonly known as: ADVIL;MOTRIN  Take 1 tablet by mouth every 8 hours as needed for Pain            CONTINUE taking these medications      ondansetron 4 MG tablet  Commonly known as: ZOFRAN  Take 1 tablet by mouth daily as needed for Nausea or Vomiting     valACYclovir 1 g tablet  Commonly known as: Valtrex  Take 1 tablet by mouth daily               Where to Get Your Medications        These medications were sent to [id number] -  Final    Drug Screen Comment: 09/26/2024 see below   Final    Comment: This method is an uncomfirmed screening test to detect only these drug  classes in urine as part of a medical workup.  Confirmatory testing  by another method should be ordered if clinically indicated.    Unconfirmed screening results must not be used for non-medical purposes (eg,  employment testing).      WBC 09/27/2024 15.4 (H)  4.8 - 10.8 K/uL Final    RBC 09/27/2024 3.57 (L)  4.20 - 5.40 M/uL Final    Hemoglobin 09/27/2024 10.0 (L)  12.0 - 16.0 g/dL Final    Hematocrit 09/27/2024 32.2 (L)  37.0 - 47.0 % Final    MCV 09/27/2024 90.2  81.0 - 99.0 fL Final    MCH 09/27/2024 28.0  27.0 - 31.0 pg Final    MCHC 09/27/2024 31.1 (L)  33.0 - 37.0 g/dL Final    RDW 09/27/2024 13.9  11.5 - 14.5 % Final    Platelets 09/27/2024 216  130 - 400 K/uL Final    MPV 09/27/2024 11.9  9.4 - 12.3 fL Final    Neutrophils % 09/27/2024 83.3 (H)  50.0 - 65.0 % Final    Lymphocytes % 09/27/2024 9.6 (L)  20.0 - 40.0 % Final    Monocytes % 09/27/2024 5.5  0.0 - 10.0 % Final    Eosinophils % 09/27/2024 0.8  0.0 - 5.0 % Final    Basophils % 09/27/2024 0.3  0.0 - 1.0 % Final    Neutrophils Absolute 09/27/2024 12.8 (H)  1.5 - 7.5 K/uL Final    Immature Granulocytes # 09/27/2024 0.1  K/uL Final    Lymphocytes Absolute 09/27/2024 1.5  1.1 - 4.5 K/uL Final    Monocytes Absolute 09/27/2024 0.90  0.00 - 0.90 K/uL Final    Eosinophils Absolute 09/27/2024 0.10  0.00 - 0.60 K/uL Final    Basophils Absolute 09/27/2024 0.10  0.00 - 0.20 K/uL Final   Admission on 09/25/2024, Discharged on 09/25/2024   Component Date Value Ref Range Status    Amnisure, POC 09/25/2024 Negative  Negative Final    Lot Number 09/25/2024 [id number]   Final    Positive QC Pass/Fail 09/25/2024 Pass   Final    Negative QC Pass/Fail 09/25/2024 Pass   Final   ]      Discharge to: Home  Follow up in 2 weeks       Discharge Date: 9/28/2024      Connor Gunter MD      Comments: maternal condition is stable and

## 2024-09-30 ENCOUNTER — TELEPHONE (OUTPATIENT)
Dept: OBGYN CLINIC | Age: 29
End: 2024-09-30

## 2024-09-30 ENCOUNTER — LACTATION ENCOUNTER (OUTPATIENT)
Dept: LABOR AND DELIVERY | Age: 29
End: 2024-09-30

## 2024-09-30 NOTE — LACTATION NOTE
This note was copied from a baby's chart.  This is to inform you that baby has been seen since discharge    Day of Life: 4    : 24 @ 9005    GA: 39.1    Mom's blood type: O+    Baby's blood type: A+ DILEEP+    Birth weight: 7-4.4 lb (3300g)    Discharge weight:6-15.8 lb (3170g)    Today's weight: 6-14.5 lb (3135g)    Weight loss: -5%    Bilizap: (draw serum if within 3 mg/dl of phototherapy on graph): 17.2    24  Total neobili: 8.5         Total neobili: 12.3    Infant feeding (type and how often in the last 24 hours): formula feeding 50-60 ml every 3 hours    Stools (in the last 24 hours): 9    Voids (in the last 24 hours): 9    Color: jaundice  Gums: moist  Skin: warm/dry  Cord: dry  Circumcision: healing, gauze and vaseline  Fontanels: soft/flat  Activity: alert/active    Education to mother:     Continue to feed baby 2 oz every 2-3 hours, return in 2 days for repeat weight check    Instructions to mother: following up with Dr. Galvez

## 2024-09-30 NOTE — TELEPHONE ENCOUNTER
Rhonda called to schedule an appointment for  2 week post partum visit . [facility] was unable to accommodate in the time frame needed. Please be advised that the best time to call Anytime.    Thank you.

## 2024-10-02 ENCOUNTER — LACTATION ENCOUNTER (OUTPATIENT)
Dept: LABOR AND DELIVERY | Age: 29
End: 2024-10-02

## 2024-10-02 NOTE — LACTATION NOTE
This note was copied from a baby's chart.  This is to inform you that baby has been seen since discharge    Day of Life: 7    : 24 @ 1435    GA: 39.1    Mom's blood type: O+    Baby's blood type: A+ DILEEP+    Birth weight: 7-4.4 lb (3300g)    Discharge weight:6-15.8 lb (3170g)    24:  6-14.5 lb (3135g)    Today's weight: 7.0 lb (3180g)    Weight loss: -3.63%    Bilizap: (draw serum if within 3 mg/dl of phototherapy on graph): 12.0    24  Total neobili: 8.5         Total neobili: 12.3    Infant feeding (type and how often in the last 24 hours): formula feeding 60 ml every 3 hours    Stools (in the last 24 hours): 1    Voids (in the last 24 hours): 6+    Color: jaundice  Gums: moist  Skin: warm/dry  Cord: dry  Circumcision: healed  Fontanels: soft/flat  Activity: alert/active    Education to mother:     Continue to feed baby 2 oz every 2-3 hours, return in 2 days for repeat weight check    Instructions to mother: following up with Dr. Galvez

## 2024-10-07 ENCOUNTER — TELEPHONE (OUTPATIENT)
Dept: OBGYN CLINIC | Age: 29
End: 2024-10-07

## 2024-10-07 SDOH — ECONOMIC STABILITY: FOOD INSECURITY: WITHIN THE PAST 12 MONTHS, THE FOOD YOU BOUGHT JUST DIDN'T LAST AND YOU DIDN'T HAVE MONEY TO GET MORE.: NEVER TRUE

## 2024-10-07 SDOH — ECONOMIC STABILITY: FOOD INSECURITY: WITHIN THE PAST 12 MONTHS, YOU WORRIED THAT YOUR FOOD WOULD RUN OUT BEFORE YOU GOT MONEY TO BUY MORE.: NEVER TRUE

## 2024-10-07 SDOH — ECONOMIC STABILITY: INCOME INSECURITY: HOW HARD IS IT FOR YOU TO PAY FOR THE VERY BASICS LIKE FOOD, HOUSING, MEDICAL CARE, AND HEATING?: NOT HARD AT ALL

## 2024-10-09 ENCOUNTER — POSTPARTUM VISIT (OUTPATIENT)
Dept: OBGYN CLINIC | Age: 29
End: 2024-10-09

## 2024-10-09 VITALS
BODY MASS INDEX: 19.14 KG/M2 | HEART RATE: 70 BPM | WEIGHT: 104 LBS | DIASTOLIC BLOOD PRESSURE: 83 MMHG | SYSTOLIC BLOOD PRESSURE: 123 MMHG | HEIGHT: 62 IN

## 2024-10-09 DIAGNOSIS — Z30.011 ENCOUNTER FOR INITIAL PRESCRIPTION OF CONTRACEPTIVE PILLS: ICD-10-CM

## 2024-10-09 DIAGNOSIS — Z13.32 ENCOUNTER FOR SCREENING FOR MATERNAL DEPRESSION: ICD-10-CM

## 2024-10-09 PROCEDURE — 0503F POSTPARTUM CARE VISIT: CPT | Performed by: ADVANCED PRACTICE MIDWIFE

## 2024-10-09 RX ORDER — LEVONORGESTREL/ETHIN.ESTRADIOL 0.1-0.02MG
1 TABLET ORAL DAILY
Qty: 1 PACKET | Refills: 3 | Status: SHIPPED | OUTPATIENT
Start: 2024-10-09

## 2024-10-09 NOTE — PATIENT INSTRUCTIONS
After Your Pregnancy: The First 12 Weeks (05:36)  Your health professional recommends that you watch this short online health video.  Find out what happens to your body after giving birth and why it can take time to feel like yourself again.  Purpose:  Explains what happens to the body after labor and delivery. Teaches what is normal recovery and when it may be time to call the doctor or midwife.  Goal:  Learn what happens to the body after labor and delivery. Teaches what is normal recovery and when it may be time to call the doctor or midwife.     How to watch the video    Scan the QR code   OR Visit the website    https://hwi.[email or web address]/[email or web address]   Current as of: 2023               Content Version: 14.0   [employer].   Care instructions adapted under license by [employer]. If you have questions about a medical condition or this instruction, always ask your healthcare professional. [employer] disclaims any warranty or liability for your use of this information.       After Your Delivery (the Postpartum Period): Care Instructions  Overview     After childbirth (postpartum period), your body goes through many changes as you recover. In these weeks after delivery, try to take good care of yourself. Get rest whenever you can and accept help from others.  It may take 4 to 6 weeks to feel like yourself again, and possibly longer if you had a  birth. You may feel sore or very tired as you recover. After delivery, you may continue to have contractions as the uterus returns to the size it was before your pregnancy. You will also have some vaginal bleeding. And you may have pain around the vagina as you heal. Several days after delivery you may also have pain and swelling in your breasts as they fill with milk. There are things you can do at home to help ease these discomforts.  After childbirth, it's common to feel emotional. You may feel irritable, cry

## 2024-10-09 NOTE — PROGRESS NOTES
[facility]  CNM Office Note    Rhonda Vasquez is a 29 y.o. female who presents today for her medical conditions/ complaints as noted below.  Chief Complaint   Patient presents with    Postpartum Care       HPI  Rhonda presents for her 2 week post partum visit. Denies pain. Minimal bleeding. Mood is good. Desires birth control.         Visit Reason:  Post-Partum Visit       Baby's Name: Thuan        Delivery Date: 24        Type of Delivery:  vaginal       Feeding: formula        LMP:  24       Contraceptive Choices:        Last PAP:  unsure        Depression: 0    Problems/Complaints today:  Patient Active Problem List   Diagnosis   (none) - all problems resolved or deleted       Patient's last menstrual period was 2024 (approximate).      History reviewed. No pertinent past medical history.  History reviewed. No pertinent surgical history.  Family History   Problem Relation Age of Onset    Seizures Mother     Diabetes Father      Social History     Tobacco Use    Smoking status: Never    Smokeless tobacco: Never   Substance Use Topics    Alcohol use: No       Current Outpatient Medications   Medication Sig Dispense Refill    ibuprofen (ADVIL;MOTRIN) 800 MG tablet Take 1 tablet by mouth every 8 hours as needed for Pain 60 tablet 0    valACYclovir (VALTREX) 1 g tablet Take 1 tablet by mouth daily 30 tablet 5    ondansetron (ZOFRAN) 4 MG tablet Take 1 tablet by mouth daily as needed for Nausea or Vomiting 30 tablet 0     No current facility-administered medications for this visit.     No Known Allergies  Vitals:    10/09/24 1521   BP: 123/83   Pulse: 70   Weight: 47.2 kg (104 lb)   Height: 1.575 m (5' 2\")     Body mass index is 19.02 kg/m².    A comprehensive review of systems was negative except for what was noted in the HPI.     Physical Exam  Constitutional:       Appearance: Normal appearance. She is well-developed and well-groomed.   HENT:      Head: Normocephalic and

## 2024-10-09 NOTE — PROGRESS NOTES
The patient returns for her post-partum visit.  All information below was reviewed with her.    Visit Reason:  Post-Partum Visit       Baby's Name: Thuan        Delivery Date: 09/26/24        Type of Delivery:  vaginal       Feeding: formula        LMP:  1/12/24       Contraceptive Choices:        Last PAP:  unsure        Depression: 0

## 2024-12-04 DIAGNOSIS — B00.9 HSV-2 INFECTION: Primary | ICD-10-CM

## 2024-12-04 RX ORDER — VALACYCLOVIR HYDROCHLORIDE 500 MG/1
500 TABLET, FILM COATED ORAL DAILY
Qty: 30 TABLET | Refills: 3 | Status: SHIPPED | OUTPATIENT
Start: 2024-12-04

## 2025-01-15 DIAGNOSIS — Z34.02 ENCOUNTER FOR PRENATAL CARE OF FIRST PREGNANCY, SECOND TRIMESTER: ICD-10-CM

## 2025-01-15 RX ORDER — ONDANSETRON 4 MG/1
4 TABLET, FILM COATED ORAL DAILY PRN
Qty: 30 TABLET | Refills: 0 | Status: SHIPPED | OUTPATIENT
Start: 2025-01-15

## 2025-01-20 DIAGNOSIS — Z30.011 ENCOUNTER FOR INITIAL PRESCRIPTION OF CONTRACEPTIVE PILLS: ICD-10-CM

## 2025-01-20 RX ORDER — LEVONORGESTREL/ETHIN.ESTRADIOL 0.1-0.02MG
1 TABLET ORAL DAILY
Qty: 1 PACKET | Refills: 5 | Status: SHIPPED | OUTPATIENT
Start: 2025-01-20

## 2025-03-17 NOTE — PATIENT INSTRUCTIONS

## 2025-03-19 ENCOUNTER — OFFICE VISIT (OUTPATIENT)
Dept: OBGYN CLINIC | Age: 30
End: 2025-03-19
Payer: COMMERCIAL

## 2025-03-19 VITALS
WEIGHT: 100 LBS | SYSTOLIC BLOOD PRESSURE: 122 MMHG | DIASTOLIC BLOOD PRESSURE: 81 MMHG | BODY MASS INDEX: 18.4 KG/M2 | HEART RATE: 71 BPM | HEIGHT: 62 IN

## 2025-03-19 DIAGNOSIS — Z30.011 ENCOUNTER FOR INITIAL PRESCRIPTION OF CONTRACEPTIVE PILLS: ICD-10-CM

## 2025-03-19 DIAGNOSIS — B00.9 HSV-2 INFECTION: ICD-10-CM

## 2025-03-19 DIAGNOSIS — Z01.419 WELL WOMAN EXAM WITH ROUTINE GYNECOLOGICAL EXAM: Primary | ICD-10-CM

## 2025-03-19 DIAGNOSIS — Z12.4 CERVICAL CANCER SCREENING: ICD-10-CM

## 2025-03-19 DIAGNOSIS — Z12.39 SCREENING BREAST EXAMINATION: ICD-10-CM

## 2025-03-19 PROCEDURE — 99395 PREV VISIT EST AGE 18-39: CPT | Performed by: ADVANCED PRACTICE MIDWIFE

## 2025-03-19 RX ORDER — VALACYCLOVIR HYDROCHLORIDE 500 MG/1
500 TABLET, FILM COATED ORAL DAILY
Qty: 90 TABLET | Refills: 3 | Status: SHIPPED | OUTPATIENT
Start: 2025-03-19

## 2025-03-19 RX ORDER — LEVONORGESTREL/ETHIN.ESTRADIOL 0.1-0.02MG
1 TABLET ORAL DAILY
Qty: 3 PACKET | Refills: 3 | Status: SHIPPED | OUTPATIENT
Start: 2025-03-19

## 2025-03-19 NOTE — PROGRESS NOTES
Pt presents today for pap smear and breast exam.  Pt would like to discuss last period, she states all she had was spotting and brown discharge.     Last mammogram: never   Last pap smear: never    Sexually active: Yes  Sexual preference: Male  Contraception: OCP  : 1  Para: 1  AB: 0  Last bone density: never    Last colonoscopy: never   Menarche: unsure   LMP: 25  Menses: regular

## 2025-03-19 NOTE — PROGRESS NOTES
[facility]  CNM Office Note    Rhonda Vasquez is a 29 y.o. female who presents today for her medical conditions/ complaints as noted below.  Chief Complaint   Patient presents with    Annual Exam       HPI  Rhonda presents for her annual GYN exam. Denies pain. No sexual concerns. Menses have been regular. Last menses was brown and spotty. Current on Sronyx.          Last mammogram: never   Last pap smear: never    Sexually active: Yes  Sexual preference: Male  Contraception: OCP  : 1  Para: 1  AB: 0  Last bone density: never    Last colonoscopy: never   Menarche: unsure   LMP: 25  Menses: regular     Problems/Complaints today:  Patient Active Problem List   Diagnosis   (none) - all problems resolved or deleted       Patient's last menstrual period was 2025 (approximate).      History reviewed. No pertinent past medical history.  History reviewed. No pertinent surgical history.  Family History   Problem Relation Age of Onset    Seizures Mother     Diabetes Father      Social History     Tobacco Use    Smoking status: Never    Smokeless tobacco: Never   Substance Use Topics    Alcohol use: No       Current Outpatient Medications   Medication Sig Dispense Refill    levonorgestrel-ethinyl estradiol (SRONYX) 0.1-20 MG-MCG per tablet Take 1 tablet by mouth daily 3 packet 3    valACYclovir (VALTREX) 500 MG tablet Take 1 tablet by mouth daily 90 tablet 3    ibuprofen (ADVIL;MOTRIN) 800 MG tablet Take 1 tablet by mouth every 8 hours as needed for Pain 60 tablet 0     No current facility-administered medications for this visit.     No Known Allergies  Vitals:    25 1514   BP: 122/81   Pulse: 71   Weight: 45.4 kg (100 lb)   Height: 1.575 m (5' 2\")     Body mass index is 18.29 kg/m².    A comprehensive review of systems was negative except for what was noted in the HPI.     Physical Exam  Constitutional:       Appearance: Normal appearance.   Genitourinary:      Bladder and

## 2025-03-26 LAB — HPV16+18+H RISK 12 DNA SPEC-IMP: NORMAL

## 2025-03-31 LAB
HPV HR 12 DNA SPEC QL NAA+PROBE: DETECTED
HPV16 DNA SPEC QL NAA+PROBE: NOT DETECTED
HPV16+18+H RISK 12 DNA SPEC-IMP: ABNORMAL
HPV18 DNA SPEC QL NAA+PROBE: NOT DETECTED

## 2025-04-01 ENCOUNTER — RESULTS FOLLOW-UP (OUTPATIENT)
Dept: OBGYN CLINIC | Age: 30
End: 2025-04-01

## 2025-06-04 ENCOUNTER — PROCEDURE VISIT (OUTPATIENT)
Dept: OBGYN CLINIC | Age: 30
End: 2025-06-04
Payer: COMMERCIAL

## 2025-06-04 VITALS
WEIGHT: 93 LBS | HEART RATE: 73 BPM | SYSTOLIC BLOOD PRESSURE: 129 MMHG | DIASTOLIC BLOOD PRESSURE: 81 MMHG | BODY MASS INDEX: 17.11 KG/M2 | HEIGHT: 62 IN

## 2025-06-04 DIAGNOSIS — Z01.812 PRE-PROCEDURAL LABORATORY EXAMINATION: ICD-10-CM

## 2025-06-04 DIAGNOSIS — R87.610 ATYPICAL SQUAMOUS CELLS OF UNDETERMINED SIGNIFICANCE (ASCUS) ON PAPANICOLAOU SMEAR OF CERVIX: Primary | ICD-10-CM

## 2025-06-04 DIAGNOSIS — R87.610 ATYPICAL SQUAMOUS CELLS OF UNDETERMINED SIGNIFICANCE ON CYTOLOGIC SMEAR OF CERVIX (ASC-US): ICD-10-CM

## 2025-06-04 DIAGNOSIS — B97.7 LOW RISK HPV INFECTION: ICD-10-CM

## 2025-06-04 LAB
CONTROL: NORMAL
PREGNANCY TEST URINE, POC: NORMAL

## 2025-06-04 PROCEDURE — 99999 PR OFFICE/OUTPT VISIT,PROCEDURE ONLY: CPT | Performed by: ADVANCED PRACTICE MIDWIFE

## 2025-06-04 PROCEDURE — 81025 URINE PREGNANCY TEST: CPT | Performed by: ADVANCED PRACTICE MIDWIFE

## 2025-06-04 NOTE — PROGRESS NOTES
[facility]  CNM Office Note    Rhonda Vasquez is a 29 y.o. female who presents today for her medical conditions/ complaints as noted below.  Chief Complaint   Patient presents with    Procedure         Rhonda presents for a problem focused visit. Pap smear in March showed ASCUS +HPV other. Does have history of abnormal pap smears with no treatment.         Patient Active Problem List   Diagnosis   (none) - all problems resolved or deleted       Patient's last menstrual period was 2025 (approximate).      History reviewed. No pertinent past medical history.  History reviewed. No pertinent surgical history.  Family History   Problem Relation Age of Onset    Seizures Mother     Diabetes Father      Social History     Tobacco Use    Smoking status: Never    Smokeless tobacco: Never   Substance Use Topics    Alcohol use: No       Current Outpatient Medications   Medication Sig Dispense Refill    levonorgestrel-ethinyl estradiol (SRONYX) 0.1-20 MG-MCG per tablet Take 1 tablet by mouth daily 3 packet 3    valACYclovir (VALTREX) 500 MG tablet Take 1 tablet by mouth daily 90 tablet 3    ibuprofen (ADVIL;MOTRIN) 800 MG tablet Take 1 tablet by mouth every 8 hours as needed for Pain 60 tablet 0     No current facility-administered medications for this visit.     No Known Allergies  Vitals:    25 1521   BP: 129/81   Pulse: 73     Body mass index is 17.01 kg/m².      Colposcopy Procedure Note    Indications: Pap smear 3 months ago showed: atypical squamous cellularity of undetermined significance (ASCUS). The prior pap showed no abnormalities.  Prior cervical/vaginal disease: normal exam without visible pathology. Prior cervical treatment: no treatment.    Procedure Details   The risks and benefits of the procedure and Written informed consent obtained.    Speculum placed in vagina and excellent visualization of cervix achieved, cervix swabbed x 3 with acetic acid

## 2025-06-10 ENCOUNTER — RESULTS FOLLOW-UP (OUTPATIENT)
Dept: OBGYN CLINIC | Age: 30
End: 2025-06-10

## 2025-06-26 ENCOUNTER — OFFICE VISIT (OUTPATIENT)
Dept: OBGYN CLINIC | Age: 30
End: 2025-06-26
Payer: COMMERCIAL

## 2025-06-26 VITALS
DIASTOLIC BLOOD PRESSURE: 87 MMHG | HEIGHT: 62 IN | WEIGHT: 96 LBS | HEART RATE: 59 BPM | SYSTOLIC BLOOD PRESSURE: 139 MMHG | BODY MASS INDEX: 17.66 KG/M2

## 2025-06-26 DIAGNOSIS — N87.1 CIN II (CERVICAL INTRAEPITHELIAL NEOPLASIA II): ICD-10-CM

## 2025-06-26 DIAGNOSIS — Z71.89 SURGICAL COUNSELING VISIT: Primary | ICD-10-CM

## 2025-06-26 PROCEDURE — 1036F TOBACCO NON-USER: CPT | Performed by: OBSTETRICS & GYNECOLOGY

## 2025-06-26 PROCEDURE — 99214 OFFICE O/P EST MOD 30 MIN: CPT | Performed by: OBSTETRICS & GYNECOLOGY

## 2025-06-26 PROCEDURE — G8419 CALC BMI OUT NRM PARAM NOF/U: HCPCS | Performed by: OBSTETRICS & GYNECOLOGY

## 2025-06-26 PROCEDURE — G8427 DOCREV CUR MEDS BY ELIG CLIN: HCPCS | Performed by: OBSTETRICS & GYNECOLOGY

## 2025-06-26 NOTE — PROGRESS NOTES
Pt has completed procedure consult with Dr. Schulz.   Pre and post-operative instructions completed. No questions or concerns at this time.   Surgical consents signed   LEE scheduled for 08/08/25   RTO in 2 weeks post op.

## 2025-06-26 NOTE — PROGRESS NOTES
Pt is here today for a sx consult -         Rhonda JT Vasquez (:  1995) is a 29 y.o. female, Established patient, here for evaluation of the following chief complaint(s):  Consultation        Subjective   History of Present Illness  The patient presents for evaluation of moderate dysplasia.    She was referred to our care following an abnormal Pap smear result. A colposcopy was performed, during which two biopsies were taken. The cells within the cervix appeared normal; however, the biopsy revealed moderate dysplasia, indicative of precancerous cells. She has not had any communication with her previous provider since the referral. 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    SOCIAL HISTORY  She works in a factory.    Review of Systems       Past Medical History:   Diagnosis Date    Pap smear abnormality of cervix/human papillomavirus (HPV) positive 2025     Family History   Problem Relation Age of Onset    Seizures Mother     Diabetes Father      History reviewed. No pertinent surgical history.     Current Outpatient Medications   Medication Sig Dispense Refill    levonorgestrel-ethinyl estradiol (SRONYX) 0.1-20 MG-MCG per tablet Take 1 tablet by mouth daily 3 packet 3    valACYclovir (VALTREX) 500 MG tablet Take 1 tablet by mouth daily 90 tablet 3    ibuprofen (ADVIL;MOTRIN) 800 MG tablet Take 1 tablet by mouth every 8 hours as needed for Pain 60 tablet 0     No current facility-administered medications for this visit.      No Known Allergies      Objective   Blood pressure 139/87, pulse 59, height 1.575 m (5' 2\"), weight 43.5 kg (96 lb), last menstrual period 2025, not currently breastfeeding.  Physical Exam    Physical Exam         Results  Labs   - Pap smear: 2025, Abnormal cells    Diagnostic Testing   - Biopsy from colposcopy: Moderate dysplasia    Lab Results   Component Value Date    WBC 15.4 (H) 2024    HGB 10.0

## 2025-06-30 ENCOUNTER — PREP FOR PROCEDURE (OUTPATIENT)
Dept: OBGYN CLINIC | Age: 30
End: 2025-06-30

## 2025-06-30 DIAGNOSIS — N87.1 CIN II (CERVICAL INTRAEPITHELIAL NEOPLASIA II): ICD-10-CM

## 2025-06-30 DIAGNOSIS — R87.610 PAPANICOLAOU SMEAR OF CERVIX WITH ATYPICAL SQUAMOUS CELLS OF UNDETERMINED SIGNIFICANCE (ASC-US): ICD-10-CM

## 2025-06-30 DIAGNOSIS — R87.618 PAP SMEAR ABNORMALITY OF CERVIX/HUMAN PAPILLOMAVIRUS (HPV) POSITIVE: ICD-10-CM

## 2025-07-14 RX ORDER — SODIUM CHLORIDE 0.9 % (FLUSH) 0.9 %
5-40 SYRINGE (ML) INJECTION EVERY 12 HOURS SCHEDULED
Status: CANCELLED | OUTPATIENT
Start: 2025-07-14

## 2025-07-14 RX ORDER — SODIUM CHLORIDE 0.9 % (FLUSH) 0.9 %
5-40 SYRINGE (ML) INJECTION PRN
Status: CANCELLED | OUTPATIENT
Start: 2025-07-14

## 2025-07-14 RX ORDER — SODIUM CHLORIDE, SODIUM LACTATE, POTASSIUM CHLORIDE, CALCIUM CHLORIDE 600; 310; 30; 20 MG/100ML; MG/100ML; MG/100ML; MG/100ML
INJECTION, SOLUTION INTRAVENOUS CONTINUOUS
Status: CANCELLED | OUTPATIENT
Start: 2025-07-14

## 2025-07-14 RX ORDER — SODIUM CHLORIDE 9 MG/ML
INJECTION, SOLUTION INTRAVENOUS PRN
Status: CANCELLED | OUTPATIENT
Start: 2025-07-14

## 2025-08-01 ENCOUNTER — HOSPITAL ENCOUNTER (OUTPATIENT)
Dept: PREADMISSION TESTING | Age: 30
Discharge: HOME | End: 2025-08-05
Payer: COMMERCIAL

## 2025-08-01 VITALS — WEIGHT: 92.6 LBS | BODY MASS INDEX: 16.94 KG/M2

## 2025-08-01 LAB
ANION GAP SERPL CALCULATED.3IONS-SCNC: 14 MMOL/L (ref 8–16)
BUN SERPL-MCNC: 11 MG/DL (ref 6–20)
CALCIUM SERPL-MCNC: 9.7 MG/DL (ref 8.6–10)
CHLORIDE SERPL-SCNC: 104 MMOL/L (ref 98–107)
CO2 SERPL-SCNC: 22 MMOL/L (ref 22–29)
CREAT SERPL-MCNC: 0.8 MG/DL (ref 0.5–0.9)
ERYTHROCYTE [DISTWIDTH] IN BLOOD BY AUTOMATED COUNT: 11.9 % (ref 11.5–14.5)
GLUCOSE SERPL-MCNC: 72 MG/DL (ref 70–99)
HCT VFR BLD AUTO: 39.4 % (ref 37–47)
HGB BLD-MCNC: 13 G/DL (ref 12–16)
MCH RBC QN AUTO: 32.7 PG (ref 27–31)
MCHC RBC AUTO-ENTMCNC: 33 G/DL (ref 33–37)
MCV RBC AUTO: 99.2 FL (ref 81–99)
PLATELET # BLD AUTO: 257 K/UL (ref 130–400)
PMV BLD AUTO: 11.5 FL (ref 9.4–12.3)
POTASSIUM SERPL-SCNC: 4.8 MMOL/L (ref 3.5–5.1)
RBC # BLD AUTO: 3.97 M/UL (ref 4.2–5.4)
SODIUM SERPL-SCNC: 140 MMOL/L (ref 136–145)
WBC # BLD AUTO: 6 K/UL (ref 4.8–10.8)

## 2025-08-01 PROCEDURE — 80048 BASIC METABOLIC PNL TOTAL CA: CPT

## 2025-08-01 PROCEDURE — 85027 COMPLETE CBC AUTOMATED: CPT

## 2025-08-08 ENCOUNTER — HOSPITAL ENCOUNTER (OUTPATIENT)
Age: 30
Setting detail: OUTPATIENT SURGERY
Discharge: HOME | End: 2025-08-08
Attending: OBSTETRICS & GYNECOLOGY | Admitting: OBSTETRICS & GYNECOLOGY
Payer: COMMERCIAL

## 2025-08-08 ENCOUNTER — ANESTHESIA EVENT (OUTPATIENT)
Dept: OPERATING ROOM | Age: 30
End: 2025-08-08
Payer: COMMERCIAL

## 2025-08-08 ENCOUNTER — ANESTHESIA (OUTPATIENT)
Dept: OPERATING ROOM | Age: 30
End: 2025-08-08
Payer: COMMERCIAL

## 2025-08-08 VITALS
WEIGHT: 92 LBS | OXYGEN SATURATION: 100 % | HEIGHT: 62 IN | HEART RATE: 48 BPM | RESPIRATION RATE: 16 BRPM | TEMPERATURE: 96.9 F | DIASTOLIC BLOOD PRESSURE: 77 MMHG | SYSTOLIC BLOOD PRESSURE: 117 MMHG | BODY MASS INDEX: 16.93 KG/M2

## 2025-08-08 DIAGNOSIS — R87.610 PAPANICOLAOU SMEAR OF CERVIX WITH ATYPICAL SQUAMOUS CELLS OF UNDETERMINED SIGNIFICANCE (ASC-US): ICD-10-CM

## 2025-08-08 DIAGNOSIS — R87.618 PAP SMEAR ABNORMALITY OF CERVIX/HUMAN PAPILLOMAVIRUS (HPV) POSITIVE: ICD-10-CM

## 2025-08-08 DIAGNOSIS — N87.1 CIN II (CERVICAL INTRAEPITHELIAL NEOPLASIA II): ICD-10-CM

## 2025-08-08 LAB
HCG, URINE, POC: NEGATIVE
Lab: NORMAL
NEGATIVE QC PASS/FAIL: NORMAL
POSITIVE QC PASS/FAIL: NORMAL

## 2025-08-08 PROCEDURE — 6360000002 HC RX W HCPCS: Performed by: NURSE ANESTHETIST, CERTIFIED REGISTERED

## 2025-08-08 PROCEDURE — 88307 TISSUE EXAM BY PATHOLOGIST: CPT | Performed by: PATHOLOGY

## 2025-08-08 PROCEDURE — 7100000010 HC PHASE II RECOVERY - FIRST 15 MIN: Performed by: OBSTETRICS & GYNECOLOGY

## 2025-08-08 PROCEDURE — 2709999900 HC NON-CHARGEABLE SUPPLY: Performed by: OBSTETRICS & GYNECOLOGY

## 2025-08-08 PROCEDURE — 3700000000 HC ANESTHESIA ATTENDED CARE: Performed by: OBSTETRICS & GYNECOLOGY

## 2025-08-08 PROCEDURE — 88307 TISSUE EXAM BY PATHOLOGIST: CPT

## 2025-08-08 PROCEDURE — 7100000011 HC PHASE II RECOVERY - ADDTL 15 MIN: Performed by: OBSTETRICS & GYNECOLOGY

## 2025-08-08 PROCEDURE — 6370000000 HC RX 637 (ALT 250 FOR IP): Performed by: OBSTETRICS & GYNECOLOGY

## 2025-08-08 PROCEDURE — 88305 TISSUE EXAM BY PATHOLOGIST: CPT

## 2025-08-08 PROCEDURE — 7100000001 HC PACU RECOVERY - ADDTL 15 MIN: Performed by: OBSTETRICS & GYNECOLOGY

## 2025-08-08 PROCEDURE — 7100000000 HC PACU RECOVERY - FIRST 15 MIN: Performed by: OBSTETRICS & GYNECOLOGY

## 2025-08-08 PROCEDURE — 2580000003 HC RX 258: Performed by: OBSTETRICS & GYNECOLOGY

## 2025-08-08 PROCEDURE — 3600000004 HC SURGERY LEVEL 4 BASE: Performed by: OBSTETRICS & GYNECOLOGY

## 2025-08-08 PROCEDURE — 3600000014 HC SURGERY LEVEL 4 ADDTL 15MIN: Performed by: OBSTETRICS & GYNECOLOGY

## 2025-08-08 PROCEDURE — 88305 TISSUE EXAM BY PATHOLOGIST: CPT | Performed by: PATHOLOGY

## 2025-08-08 PROCEDURE — 3700000001 HC ADD 15 MINUTES (ANESTHESIA): Performed by: OBSTETRICS & GYNECOLOGY

## 2025-08-08 RX ORDER — LIDOCAINE HYDROCHLORIDE 10 MG/ML
INJECTION, SOLUTION INFILTRATION; PERINEURAL
Status: DISCONTINUED | OUTPATIENT
Start: 2025-08-08 | End: 2025-08-08 | Stop reason: SDUPTHER

## 2025-08-08 RX ORDER — SODIUM CHLORIDE 0.9 % (FLUSH) 0.9 %
5-40 SYRINGE (ML) INJECTION PRN
Status: DISCONTINUED | OUTPATIENT
Start: 2025-08-08 | End: 2025-08-08 | Stop reason: HOSPADM

## 2025-08-08 RX ORDER — ACETAMINOPHEN AND CODEINE PHOSPHATE 300; 30 MG/1; MG/1
1 TABLET ORAL EVERY 6 HOURS PRN
Qty: 20 TABLET | Refills: 0 | Status: SHIPPED | OUTPATIENT
Start: 2025-08-08 | End: 2025-08-13

## 2025-08-08 RX ORDER — HYDRALAZINE HYDROCHLORIDE 20 MG/ML
10 INJECTION INTRAMUSCULAR; INTRAVENOUS
Status: DISCONTINUED | OUTPATIENT
Start: 2025-08-08 | End: 2025-08-08 | Stop reason: HOSPADM

## 2025-08-08 RX ORDER — LABETALOL HYDROCHLORIDE 5 MG/ML
10 INJECTION, SOLUTION INTRAVENOUS
Status: DISCONTINUED | OUTPATIENT
Start: 2025-08-08 | End: 2025-08-08 | Stop reason: HOSPADM

## 2025-08-08 RX ORDER — PROPOFOL 10 MG/ML
INJECTION, EMULSION INTRAVENOUS
Status: DISCONTINUED | OUTPATIENT
Start: 2025-08-08 | End: 2025-08-08 | Stop reason: SDUPTHER

## 2025-08-08 RX ORDER — FENTANYL CITRATE 50 UG/ML
INJECTION, SOLUTION INTRAMUSCULAR; INTRAVENOUS
Status: DISCONTINUED | OUTPATIENT
Start: 2025-08-08 | End: 2025-08-08 | Stop reason: SDUPTHER

## 2025-08-08 RX ORDER — FERRIC SUBSULFATE 20-22G/100
SOLUTION, NON-ORAL MISCELLANEOUS PRN
Status: DISCONTINUED | OUTPATIENT
Start: 2025-08-08 | End: 2025-08-08 | Stop reason: ALTCHOICE

## 2025-08-08 RX ORDER — SODIUM CHLORIDE, SODIUM LACTATE, POTASSIUM CHLORIDE, CALCIUM CHLORIDE 600; 310; 30; 20 MG/100ML; MG/100ML; MG/100ML; MG/100ML
INJECTION, SOLUTION INTRAVENOUS CONTINUOUS
Status: DISCONTINUED | OUTPATIENT
Start: 2025-08-08 | End: 2025-08-08 | Stop reason: HOSPADM

## 2025-08-08 RX ORDER — ONDANSETRON 2 MG/ML
INJECTION INTRAMUSCULAR; INTRAVENOUS
Status: DISCONTINUED | OUTPATIENT
Start: 2025-08-08 | End: 2025-08-08 | Stop reason: SDUPTHER

## 2025-08-08 RX ORDER — DEXAMETHASONE SODIUM PHOSPHATE 10 MG/ML
INJECTION, SOLUTION INTRAMUSCULAR; INTRAVENOUS
Status: DISCONTINUED | OUTPATIENT
Start: 2025-08-08 | End: 2025-08-08 | Stop reason: SDUPTHER

## 2025-08-08 RX ORDER — HYDROMORPHONE HYDROCHLORIDE 1 MG/ML
0.5 INJECTION, SOLUTION INTRAMUSCULAR; INTRAVENOUS; SUBCUTANEOUS EVERY 5 MIN PRN
Status: DISCONTINUED | OUTPATIENT
Start: 2025-08-08 | End: 2025-08-08 | Stop reason: HOSPADM

## 2025-08-08 RX ORDER — SODIUM CHLORIDE 0.9 % (FLUSH) 0.9 %
5-40 SYRINGE (ML) INJECTION EVERY 12 HOURS SCHEDULED
Status: DISCONTINUED | OUTPATIENT
Start: 2025-08-08 | End: 2025-08-08 | Stop reason: HOSPADM

## 2025-08-08 RX ORDER — MIDAZOLAM HYDROCHLORIDE 1 MG/ML
INJECTION, SOLUTION INTRAMUSCULAR; INTRAVENOUS
Status: DISCONTINUED | OUTPATIENT
Start: 2025-08-08 | End: 2025-08-08 | Stop reason: SDUPTHER

## 2025-08-08 RX ORDER — IPRATROPIUM BROMIDE AND ALBUTEROL SULFATE 2.5; .5 MG/3ML; MG/3ML
1 SOLUTION RESPIRATORY (INHALATION)
Status: DISCONTINUED | OUTPATIENT
Start: 2025-08-08 | End: 2025-08-08 | Stop reason: HOSPADM

## 2025-08-08 RX ORDER — PROCHLORPERAZINE EDISYLATE 5 MG/ML
5 INJECTION INTRAMUSCULAR; INTRAVENOUS
Status: DISCONTINUED | OUTPATIENT
Start: 2025-08-08 | End: 2025-08-08 | Stop reason: HOSPADM

## 2025-08-08 RX ORDER — MEPERIDINE HYDROCHLORIDE 25 MG/ML
12.5 INJECTION INTRAMUSCULAR; INTRAVENOUS; SUBCUTANEOUS
Status: DISCONTINUED | OUTPATIENT
Start: 2025-08-08 | End: 2025-08-08 | Stop reason: HOSPADM

## 2025-08-08 RX ORDER — DIPHENHYDRAMINE HYDROCHLORIDE 50 MG/ML
12.5 INJECTION, SOLUTION INTRAMUSCULAR; INTRAVENOUS
Status: DISCONTINUED | OUTPATIENT
Start: 2025-08-08 | End: 2025-08-08 | Stop reason: HOSPADM

## 2025-08-08 RX ORDER — IBUPROFEN 800 MG/1
800 TABLET, FILM COATED ORAL EVERY 8 HOURS PRN
Qty: 90 TABLET | Refills: 0 | Status: SHIPPED | OUTPATIENT
Start: 2025-08-08

## 2025-08-08 RX ORDER — HYDROMORPHONE HYDROCHLORIDE 1 MG/ML
0.25 INJECTION, SOLUTION INTRAMUSCULAR; INTRAVENOUS; SUBCUTANEOUS EVERY 5 MIN PRN
Status: DISCONTINUED | OUTPATIENT
Start: 2025-08-08 | End: 2025-08-08 | Stop reason: HOSPADM

## 2025-08-08 RX ORDER — SODIUM CHLORIDE 9 MG/ML
INJECTION, SOLUTION INTRAVENOUS PRN
Status: DISCONTINUED | OUTPATIENT
Start: 2025-08-08 | End: 2025-08-08 | Stop reason: HOSPADM

## 2025-08-08 RX ADMIN — SODIUM CHLORIDE, SODIUM LACTATE, POTASSIUM CHLORIDE, AND CALCIUM CHLORIDE: .6; .31; .03; .02 INJECTION, SOLUTION INTRAVENOUS at 11:35

## 2025-08-08 RX ADMIN — PROPOFOL 200 MG: 10 INJECTION, EMULSION INTRAVENOUS at 13:52

## 2025-08-08 RX ADMIN — MIDAZOLAM 2 MG: 1 INJECTION INTRAMUSCULAR; INTRAVENOUS at 13:44

## 2025-08-08 RX ADMIN — FENTANYL CITRATE 50 MCG: 0.05 INJECTION, SOLUTION INTRAMUSCULAR; INTRAVENOUS at 13:50

## 2025-08-08 RX ADMIN — LIDOCAINE HYDROCHLORIDE 50 MG: 10 INJECTION, SOLUTION INFILTRATION; PERINEURAL at 13:52

## 2025-08-08 RX ADMIN — ONDANSETRON 4 MG: 2 INJECTION INTRAMUSCULAR; INTRAVENOUS at 14:13

## 2025-08-08 RX ADMIN — DEXAMETHASONE SODIUM PHOSPHATE 10 MG: 10 INJECTION, SOLUTION INTRAMUSCULAR; INTRAVENOUS at 13:55

## 2025-08-08 ASSESSMENT — PAIN DESCRIPTION - DESCRIPTORS: DESCRIPTORS: CRAMPING

## 2025-08-08 ASSESSMENT — ENCOUNTER SYMPTOMS
EYES NEGATIVE: 1
RESPIRATORY NEGATIVE: 1
ALLERGIC/IMMUNOLOGIC NEGATIVE: 1
GASTROINTESTINAL NEGATIVE: 1

## 2025-08-08 ASSESSMENT — LIFESTYLE VARIABLES: SMOKING_STATUS: 0

## 2025-08-08 ASSESSMENT — PAIN - FUNCTIONAL ASSESSMENT
PAIN_FUNCTIONAL_ASSESSMENT: 0-10
PAIN_FUNCTIONAL_ASSESSMENT: NONE - DENIES PAIN

## 2025-08-21 ENCOUNTER — OFFICE VISIT (OUTPATIENT)
Dept: OBGYN CLINIC | Age: 30
End: 2025-08-21

## 2025-08-21 VITALS
SYSTOLIC BLOOD PRESSURE: 119 MMHG | DIASTOLIC BLOOD PRESSURE: 79 MMHG | WEIGHT: 93.3 LBS | BODY MASS INDEX: 17.17 KG/M2 | HEART RATE: 66 BPM | HEIGHT: 62 IN

## 2025-08-21 DIAGNOSIS — Z48.89 POSTOPERATIVE VISIT: Primary | ICD-10-CM

## 2025-08-21 DIAGNOSIS — Z98.890 S/P LEEP: ICD-10-CM

## 2025-08-21 PROCEDURE — 99024 POSTOP FOLLOW-UP VISIT: CPT | Performed by: OBSTETRICS & GYNECOLOGY

## (undated) DEVICE — SEAL ENDO INSTR SELF SEAL UROLOGY

## (undated) DEVICE — PAD N ADH W3XL8IN POLY COT SFT PERF FLM EASILY CUT ABSRB

## (undated) DEVICE — CLEANER CAUT TIP W2XL2IN RADPQ STRP STURDY ADH BK FOR EZ MT

## (undated) DEVICE — ELECTRODE ES L11CM DIA5MM BALL SHFT RED DISP UTAHLOOP

## (undated) DEVICE — SUTURE PERMAHAND SZ 2-0 L18IN NONABSORBABLE BLK L26MM FS 685G

## (undated) DEVICE — TUBING SCTION CONN 1/4X20 RIB

## (undated) DEVICE — GLOVE SURG SZ 75 CRM LTX FREE POLYISOPRENE POLYMER BEAD ANTI

## (undated) DEVICE — Device

## (undated) DEVICE — PENCIL BTTN S S CAUT TIP W HOLSTER 25 50